# Patient Record
Sex: FEMALE | Race: WHITE | NOT HISPANIC OR LATINO | Employment: FULL TIME | URBAN - METROPOLITAN AREA
[De-identification: names, ages, dates, MRNs, and addresses within clinical notes are randomized per-mention and may not be internally consistent; named-entity substitution may affect disease eponyms.]

---

## 2017-08-20 ENCOUNTER — HOSPITAL ENCOUNTER (EMERGENCY)
Facility: HOSPITAL | Age: 27
Discharge: HOME | End: 2017-08-21
Attending: EMERGENCY MEDICINE | Admitting: EMERGENCY MEDICINE

## 2017-08-20 ENCOUNTER — APPOINTMENT (OUTPATIENT)
Dept: ULTRASOUND IMAGING | Facility: HOSPITAL | Age: 27
End: 2017-08-20

## 2017-08-20 DIAGNOSIS — O20.9 VAGINAL BLEEDING IN PREGNANCY, FIRST TRIMESTER: ICD-10-CM

## 2017-08-20 DIAGNOSIS — O20.0 THREATENED MISCARRIAGE IN EARLY PREGNANCY: Primary | ICD-10-CM

## 2017-08-20 LAB
ABO GROUP BLD: NORMAL
ALBUMIN SERPL-MCNC: 4.8 G/DL (ref 3.2–4.8)
ALBUMIN/GLOB SERPL: 1.4 G/DL (ref 1.5–2.5)
ALP SERPL-CCNC: 81 U/L (ref 25–100)
ALT SERPL W P-5'-P-CCNC: 22 U/L (ref 7–40)
ANION GAP SERPL CALCULATED.3IONS-SCNC: 5 MMOL/L (ref 3–11)
AST SERPL-CCNC: 19 U/L (ref 0–33)
BACTERIA UR QL AUTO: ABNORMAL /HPF
BASOPHILS # BLD AUTO: 0.02 10*3/MM3 (ref 0–0.2)
BASOPHILS NFR BLD AUTO: 0.1 % (ref 0–1)
BILIRUB SERPL-MCNC: 0.2 MG/DL (ref 0.3–1.2)
BILIRUB UR QL STRIP: NEGATIVE
BUN BLD-MCNC: 12 MG/DL (ref 9–23)
BUN/CREAT SERPL: 15 (ref 7–25)
CALCIUM SPEC-SCNC: 9.9 MG/DL (ref 8.7–10.4)
CHLORIDE SERPL-SCNC: 108 MMOL/L (ref 99–109)
CLARITY UR: CLEAR
CO2 SERPL-SCNC: 26 MMOL/L (ref 20–31)
COD CRY URNS QL: ABNORMAL /HPF
COLOR UR: YELLOW
CREAT BLD-MCNC: 0.8 MG/DL (ref 0.6–1.3)
DEPRECATED RDW RBC AUTO: 42.7 FL (ref 37–54)
EOSINOPHIL # BLD AUTO: 0.33 10*3/MM3 (ref 0–0.3)
EOSINOPHIL NFR BLD AUTO: 2.3 % (ref 0–3)
ERYTHROCYTE [DISTWIDTH] IN BLOOD BY AUTOMATED COUNT: 12.8 % (ref 11.3–14.5)
GFR SERPL CREATININE-BSD FRML MDRD: 87 ML/MIN/1.73
GLOBULIN UR ELPH-MCNC: 3.5 GM/DL
GLUCOSE BLD-MCNC: 72 MG/DL (ref 70–100)
GLUCOSE UR STRIP-MCNC: NEGATIVE MG/DL
HCG INTACT+B SERPL-ACNC: 89 MIU/ML
HCT VFR BLD AUTO: 46 % (ref 34.5–44)
HGB BLD-MCNC: 15.7 G/DL (ref 11.5–15.5)
HGB UR QL STRIP.AUTO: ABNORMAL
HYALINE CASTS UR QL AUTO: ABNORMAL /LPF
IMM GRANULOCYTES # BLD: 0.03 10*3/MM3 (ref 0–0.03)
IMM GRANULOCYTES NFR BLD: 0.2 % (ref 0–0.6)
KETONES UR QL STRIP: NEGATIVE
LEUKOCYTE ESTERASE UR QL STRIP.AUTO: ABNORMAL
LYMPHOCYTES # BLD AUTO: 3.67 10*3/MM3 (ref 0.6–4.8)
LYMPHOCYTES NFR BLD AUTO: 25.3 % (ref 24–44)
MCH RBC QN AUTO: 31.3 PG (ref 27–31)
MCHC RBC AUTO-ENTMCNC: 34.1 G/DL (ref 32–36)
MCV RBC AUTO: 91.6 FL (ref 80–99)
MONOCYTES # BLD AUTO: 1.18 10*3/MM3 (ref 0–1)
MONOCYTES NFR BLD AUTO: 8.1 % (ref 0–12)
MUCOUS THREADS URNS QL MICRO: ABNORMAL /HPF
NEUTROPHILS # BLD AUTO: 9.29 10*3/MM3 (ref 1.5–8.3)
NEUTROPHILS NFR BLD AUTO: 64 % (ref 41–71)
NITRITE UR QL STRIP: NEGATIVE
NUMBER OF DOSES: NORMAL
PH UR STRIP.AUTO: 5.5 [PH] (ref 5–8)
PLATELET # BLD AUTO: 348 10*3/MM3 (ref 150–450)
PMV BLD AUTO: 10.2 FL (ref 6–12)
POTASSIUM BLD-SCNC: 3.5 MMOL/L (ref 3.5–5.5)
PROT SERPL-MCNC: 8.3 G/DL (ref 5.7–8.2)
PROT UR QL STRIP: NEGATIVE
RBC # BLD AUTO: 5.02 10*6/MM3 (ref 3.89–5.14)
RBC # UR: ABNORMAL /HPF
REF LAB TEST METHOD: ABNORMAL
RH BLD: POSITIVE
SODIUM BLD-SCNC: 139 MMOL/L (ref 132–146)
SP GR UR STRIP: >=1.03 (ref 1–1.03)
SQUAMOUS #/AREA URNS HPF: ABNORMAL /HPF
UROBILINOGEN UR QL STRIP: ABNORMAL
WBC NRBC COR # BLD: 14.52 10*3/MM3 (ref 3.5–10.8)
WBC UR QL AUTO: ABNORMAL /HPF

## 2017-08-20 PROCEDURE — 36415 COLL VENOUS BLD VENIPUNCTURE: CPT

## 2017-08-20 PROCEDURE — 86900 BLOOD TYPING SEROLOGIC ABO: CPT | Performed by: EMERGENCY MEDICINE

## 2017-08-20 PROCEDURE — 76817 TRANSVAGINAL US OBSTETRIC: CPT

## 2017-08-20 PROCEDURE — 87086 URINE CULTURE/COLONY COUNT: CPT | Performed by: EMERGENCY MEDICINE

## 2017-08-20 PROCEDURE — 84702 CHORIONIC GONADOTROPIN TEST: CPT | Performed by: EMERGENCY MEDICINE

## 2017-08-20 PROCEDURE — 80053 COMPREHEN METABOLIC PANEL: CPT | Performed by: EMERGENCY MEDICINE

## 2017-08-20 PROCEDURE — 86901 BLOOD TYPING SEROLOGIC RH(D): CPT | Performed by: EMERGENCY MEDICINE

## 2017-08-20 PROCEDURE — 85025 COMPLETE CBC W/AUTO DIFF WBC: CPT | Performed by: EMERGENCY MEDICINE

## 2017-08-20 PROCEDURE — 81001 URINALYSIS AUTO W/SCOPE: CPT | Performed by: EMERGENCY MEDICINE

## 2017-08-20 PROCEDURE — 99284 EMERGENCY DEPT VISIT MOD MDM: CPT

## 2017-08-20 RX ORDER — ACETAMINOPHEN 500 MG
1000 TABLET ORAL ONCE
Status: COMPLETED | OUTPATIENT
Start: 2017-08-20 | End: 2017-08-20

## 2017-08-20 RX ADMIN — ACETAMINOPHEN 1000 MG: 500 TABLET ORAL at 21:15

## 2017-08-21 VITALS
HEART RATE: 75 BPM | RESPIRATION RATE: 18 BRPM | BODY MASS INDEX: 33.46 KG/M2 | DIASTOLIC BLOOD PRESSURE: 76 MMHG | TEMPERATURE: 98.6 F | OXYGEN SATURATION: 97 % | HEIGHT: 64 IN | SYSTOLIC BLOOD PRESSURE: 110 MMHG | WEIGHT: 196 LBS

## 2017-08-21 NOTE — ED PROVIDER NOTES
Subjective   HPI Comments: Ms. Margie Marrufo is an approximately 6 week pregnant 27 year old female who presents to the ED with c/o vaginal bleeding. The patient states that she began to notice brown colored spots while wiping 2 days ago. The patient states that it has persisted until today, when she noticed that the spots turned a light pink. The patient states that the bleeding has been accompanied by clots and tissue. The patient reports that the bleeding was originally accompanied by abd cramping, however the cramping ceased after passing clots today. The patient denies any other acute sx at this time. /A0    Patient is a 27 y.o. female presenting with vaginal bleeding.   History provided by:  Patient  Vaginal Bleeding   Quality:  Bright red, clots and passed tissue  Severity:  Mild  Onset quality:  Gradual  Duration:  2 days  Timing:  Constant  Progression:  Partially resolved  Chronicity:  New  Menstrual history:  Missed period  Possible pregnancy: yes    Relieved by:  Nothing  Worsened by:  Nothing  Ineffective treatments:  None tried  Abdominal pain: Cramping.    Risk factors: no prior miscarriage        Review of Systems   Constitutional: Negative.    Respiratory: Negative.    Cardiovascular: Negative.    Gastrointestinal: Abdominal pain: Cramping.   Genitourinary: Positive for vaginal bleeding.   Musculoskeletal: Negative.    Allergic/Immunologic: Negative for immunocompromised state.   Hematological: Does not bruise/bleed easily.   Psychiatric/Behavioral: Negative.    All other systems reviewed and are negative.      History reviewed. No pertinent past medical history.    No Known Allergies    Past Surgical History:   Procedure Laterality Date   • CHOLECYSTECTOMY     • EAR TUBES     • TONSILLECTOMY         History reviewed. No pertinent family history.    Social History     Social History   • Marital status: Single     Spouse name: N/A   • Number of children: N/A   • Years of education: N/A      Social History Main Topics   • Smoking status: Former Smoker   • Smokeless tobacco: None      Comment: quit smoking a couple days ago - usually smokes 1/2 PPD   • Alcohol use No   • Drug use: No   • Sexual activity: Not Asked     Other Topics Concern   • None     Social History Narrative   • None         Objective   Physical Exam   Constitutional: She is oriented to person, place, and time. She appears well-developed and well-nourished. No distress.   HENT:   Head: Normocephalic and atraumatic.   Eyes: Conjunctivae are normal. No scleral icterus.   Neck: Normal range of motion. Neck supple.   Cardiovascular: Normal rate, regular rhythm, normal heart sounds and intact distal pulses.  Exam reveals no gallop and no friction rub.    No murmur heard.  Pulmonary/Chest: Effort normal and breath sounds normal. No respiratory distress. She has no wheezes. She has no rales.   Abdominal: Soft. Bowel sounds are normal. She exhibits no mass. There is tenderness in the suprapubic area. There is guarding (With deep palpation). There is no rigidity and no rebound.   Musculoskeletal: Normal range of motion.   Neurological: She is alert and oriented to person, place, and time.   Skin: Skin is warm and dry. She is not diaphoretic.   Psychiatric: She has a normal mood and affect. Her behavior is normal.   Nursing note and vitals reviewed.      Procedures         ED Course  ED Course   Value Comment By Time   ABO Type: A (Reviewed) Tyree Emery MD 08/20 2154   RH type: Positive (Reviewed) Tyree Emery MD 08/20 2154   HCG Quantitative: 89.00 (Reviewed) Tyree Emery MD 08/20 2202    No evidence of ectopic pregnancy or intrauterine pregnancy.  The patient's beta hCG is incredibly low.  She is likely experiencing an incomplete miscarriage.  At this point we will discharge her home to follow-up with OB within a couple of days for recheck of her hCG.  She is otherwise resting comfortable in no complaints.  Findings  were plan discussed.  Patient understands and agrees with plan. Tyree Emery MD 6       Recent Results (from the past 24 hour(s))   Comprehensive Metabolic Panel    Collection Time: 17  9:17 PM   Result Value Ref Range    Glucose 72 70 - 100 mg/dL    BUN 12 9 - 23 mg/dL    Creatinine 0.80 0.60 - 1.30 mg/dL    Sodium 139 132 - 146 mmol/L    Potassium 3.5 3.5 - 5.5 mmol/L    Chloride 108 99 - 109 mmol/L    CO2 26.0 20.0 - 31.0 mmol/L    Calcium 9.9 8.7 - 10.4 mg/dL    Total Protein 8.3 (H) 5.7 - 8.2 g/dL    Albumin 4.80 3.20 - 4.80 g/dL    ALT (SGPT) 22 7 - 40 U/L    AST (SGOT) 19 0 - 33 U/L    Alkaline Phosphatase 81 25 - 100 U/L    Total Bilirubin 0.2 (L) 0.3 - 1.2 mg/dL    eGFR Non African Amer 87 >60 mL/min/1.73    Globulin 3.5 gm/dL    A/G Ratio 1.4 (L) 1.5 - 2.5 g/dL    BUN/Creatinine Ratio 15.0 7.0 - 25.0    Anion Gap 5.0 3.0 - 11.0 mmol/L   hCG, Quantitative, Pregnancy    Collection Time: 17  9:17 PM   Result Value Ref Range    HCG Quantitative 89.00 mIU/mL    RhIg Evaluation    Collection Time: 17  9:17 PM   Result Value Ref Range    ABO Type A     RH type Positive    Doses of Rh Immune Globulin    Collection Time: 17  9:17 PM   Result Value Ref Range    Number of Doses       RhIg is not indicated due to the patient's Rh status   CBC Auto Differential    Collection Time: 17  9:17 PM   Result Value Ref Range    WBC 14.52 (H) 3.50 - 10.80 10*3/mm3    RBC 5.02 3.89 - 5.14 10*6/mm3    Hemoglobin 15.7 (H) 11.5 - 15.5 g/dL    Hematocrit 46.0 (H) 34.5 - 44.0 %    MCV 91.6 80.0 - 99.0 fL    MCH 31.3 (H) 27.0 - 31.0 pg    MCHC 34.1 32.0 - 36.0 g/dL    RDW 12.8 11.3 - 14.5 %    RDW-SD 42.7 37.0 - 54.0 fl    MPV 10.2 6.0 - 12.0 fL    Platelets 348 150 - 450 10*3/mm3    Neutrophil % 64.0 41.0 - 71.0 %    Lymphocyte % 25.3 24.0 - 44.0 %    Monocyte % 8.1 0.0 - 12.0 %    Eosinophil % 2.3 0.0 - 3.0 %    Basophil % 0.1 0.0 - 1.0 %    Immature Grans % 0.2 0.0 - 0.6 %     Neutrophils, Absolute 9.29 (H) 1.50 - 8.30 10*3/mm3    Lymphocytes, Absolute 3.67 0.60 - 4.80 10*3/mm3    Monocytes, Absolute 1.18 (H) 0.00 - 1.00 10*3/mm3    Eosinophils, Absolute 0.33 (H) 0.00 - 0.30 10*3/mm3    Basophils, Absolute 0.02 0.00 - 0.20 10*3/mm3    Immature Grans, Absolute 0.03 0.00 - 0.03 10*3/mm3   Urinalysis With / Culture If Indicated    Collection Time: 17  9:18 PM   Result Value Ref Range    Color, UA Yellow Yellow, Straw    Appearance, UA Clear Clear    pH, UA 5.5 5.0 - 8.0    Specific Gravity, UA >=1.030 1.001 - 1.030    Glucose, UA Negative Negative    Ketones, UA Negative Negative    Bilirubin, UA Negative Negative    Blood, UA Large (3+) (A) Negative    Protein, UA Negative Negative    Leuk Esterase, UA Small (1+) (A) Negative    Nitrite, UA Negative Negative    Urobilinogen, UA 0.2 E.U./dL 0.2 - 1.0 E.U./dL   Urinalysis, Microscopic Only    Collection Time: 17  9:18 PM   Result Value Ref Range    RBC, UA 3-6 (A) None Seen, 0-2 /HPF    WBC, UA 6-12 (A) None Seen /HPF    Bacteria, UA None Seen None Seen, Trace /HPF    Squamous Epithelial Cells, UA 3-6 (A) None Seen, 0-2 /HPF    Hyaline Casts, UA 0-6 0 - 6 /LPF    Calcium Oxalate Crystals, UA Large/3+ None Seen /HPF    Mucus, UA Small/1+ (A) None Seen, Trace /HPF    Methodology Manual Light Microscopy      Note: In addition to lab results from this visit, the labs listed above may include labs taken at another facility or during a different encounter within the last 24 hours. Please correlate lab times with ED admission and discharge times for further clarification of the services performed during this visit.    US Ob Transvaginal   Final Result   Abnormal   1.  No intrauterine gestation identified.  The differential includes very early    intrauterine pregnancy, spontaneous , as well as non-visualized ectopic    pregnancy. Follow-up ultrasound and correlation with serial beta HCG levels is    recommended for further  evaluation.   2.  Small right ovarian cysts.      THIS DOCUMENT HAS BEEN ELECTRONICALLY SIGNED BY TIMOTHY AG MD        Vitals:    08/20/17 2201 08/20/17 2336 08/21/17 0000 08/21/17 0015   BP:  123/75 110/76    BP Location:       Patient Position:       Pulse:  85  75   Resp:  16  18   Temp:       TempSrc:       SpO2: 97% 98% 97%    Weight:       Height:         Medications   acetaminophen (TYLENOL) tablet 1,000 mg (1,000 mg Oral Given 8/20/17 2115)     ECG/EMG Results (last 24 hours)     ** No results found for the last 24 hours. **                      MDM  Number of Diagnoses or Management Options     Amount and/or Complexity of Data Reviewed  Clinical lab tests: reviewed  Tests in the radiology section of CPT®: reviewed  Independent visualization of images, tracings, or specimens: yes        Final diagnoses:   Threatened miscarriage in early pregnancy   Vaginal bleeding in pregnancy, first trimester       Documentation assistance provided by payton Ramos.  Information recorded by the scribe was done at my direction and has been verified and validated by me.     Nelson Ramos  08/20/17 2129       Tyree Emery MD  08/21/17 0149

## 2017-08-22 LAB — BACTERIA SPEC AEROBE CULT: NORMAL

## 2018-01-18 LAB
EXTERNAL ABO GROUPING: NORMAL
EXTERNAL ANTIBODY SCREEN: NEGATIVE
EXTERNAL CHLAMYDIA SCREEN: NEGATIVE
EXTERNAL GONORRHEA SCREEN: NEGATIVE
EXTERNAL HEPATITIS B SURFACE ANTIGEN: NEGATIVE
EXTERNAL HEPATITIS C AB: <0.1
EXTERNAL RH FACTOR: POSITIVE
EXTERNAL RUBELLA QUALITATIVE: NORMAL
EXTERNAL SYPHILIS RPR SCREEN: NORMAL
EXTERNAL URINE DRUG SCREEN: NEGATIVE
HIV1 P24 AG SERPL QL IA: NORMAL

## 2018-03-17 ENCOUNTER — HOSPITAL ENCOUNTER (EMERGENCY)
Facility: HOSPITAL | Age: 28
End: 2018-03-17

## 2018-03-18 ENCOUNTER — HOSPITAL ENCOUNTER (OUTPATIENT)
Facility: HOSPITAL | Age: 28
Setting detail: OBSERVATION
Discharge: HOME | End: 2018-03-18
Attending: OBSTETRICS & GYNECOLOGY | Admitting: OBSTETRICS & GYNECOLOGY

## 2018-03-18 VITALS
SYSTOLIC BLOOD PRESSURE: 130 MMHG | HEART RATE: 103 BPM | HEIGHT: 64 IN | DIASTOLIC BLOOD PRESSURE: 72 MMHG | BODY MASS INDEX: 36.88 KG/M2 | WEIGHT: 216 LBS | TEMPERATURE: 98.4 F

## 2018-03-18 PROBLEM — M54.50 LOW BACK PAIN DURING PREGNANCY IN SECOND TRIMESTER: Status: ACTIVE | Noted: 2018-03-18

## 2018-03-18 PROBLEM — O26.892 LOW BACK PAIN DURING PREGNANCY IN SECOND TRIMESTER: Status: ACTIVE | Noted: 2018-03-18

## 2018-03-18 LAB
ALBUMIN SERPL-MCNC: 3.9 G/DL (ref 3.2–4.8)
ALBUMIN/GLOB SERPL: 1.2 G/DL (ref 1.5–2.5)
ALP SERPL-CCNC: 56 U/L (ref 25–100)
ALT SERPL W P-5'-P-CCNC: 15 U/L (ref 7–40)
ANION GAP SERPL CALCULATED.3IONS-SCNC: 11 MMOL/L (ref 3–11)
AST SERPL-CCNC: 17 U/L (ref 0–33)
BILIRUB SERPL-MCNC: 0.3 MG/DL (ref 0.3–1.2)
BILIRUB UR QL STRIP: NEGATIVE
BUN BLD-MCNC: 5 MG/DL (ref 9–23)
BUN/CREAT SERPL: 10 (ref 7–25)
CALCIUM SPEC-SCNC: 9 MG/DL (ref 8.7–10.4)
CHLORIDE SERPL-SCNC: 108 MMOL/L (ref 99–109)
CLARITY UR: CLEAR
CO2 SERPL-SCNC: 18 MMOL/L (ref 20–31)
COLOR UR: YELLOW
CREAT BLD-MCNC: 0.5 MG/DL (ref 0.6–1.3)
DEPRECATED RDW RBC AUTO: 43.5 FL (ref 37–54)
ERYTHROCYTE [DISTWIDTH] IN BLOOD BY AUTOMATED COUNT: 13.4 % (ref 11.3–14.5)
GFR SERPL CREATININE-BSD FRML MDRD: 149 ML/MIN/1.73
GLOBULIN UR ELPH-MCNC: 3.3 GM/DL
GLUCOSE BLD-MCNC: 111 MG/DL (ref 70–100)
GLUCOSE UR STRIP-MCNC: NEGATIVE MG/DL
HCT VFR BLD AUTO: 36.3 % (ref 34.5–44)
HGB BLD-MCNC: 12.3 G/DL (ref 11.5–15.5)
HGB UR QL STRIP.AUTO: NEGATIVE
KETONES UR QL STRIP: ABNORMAL
LEUKOCYTE ESTERASE UR QL STRIP.AUTO: NEGATIVE
MCH RBC QN AUTO: 30.2 PG (ref 27–31)
MCHC RBC AUTO-ENTMCNC: 33.9 G/DL (ref 32–36)
MCV RBC AUTO: 89.2 FL (ref 80–99)
NITRITE UR QL STRIP: NEGATIVE
PH UR STRIP.AUTO: 5.5 [PH] (ref 5–8)
PLATELET # BLD AUTO: 267 10*3/MM3 (ref 150–450)
PMV BLD AUTO: 10.4 FL (ref 6–12)
POTASSIUM BLD-SCNC: 4 MMOL/L (ref 3.5–5.5)
PROT SERPL-MCNC: 7.2 G/DL (ref 5.7–8.2)
PROT UR QL STRIP: NEGATIVE
RBC # BLD AUTO: 4.07 10*6/MM3 (ref 3.89–5.14)
SODIUM BLD-SCNC: 137 MMOL/L (ref 132–146)
SP GR UR STRIP: 1.02 (ref 1–1.03)
UROBILINOGEN UR QL STRIP: ABNORMAL
WBC NRBC COR # BLD: 11.71 10*3/MM3 (ref 3.5–10.8)

## 2018-03-18 PROCEDURE — 85027 COMPLETE CBC AUTOMATED: CPT | Performed by: OBSTETRICS & GYNECOLOGY

## 2018-03-18 PROCEDURE — 80053 COMPREHEN METABOLIC PANEL: CPT | Performed by: OBSTETRICS & GYNECOLOGY

## 2018-03-18 PROCEDURE — G0378 HOSPITAL OBSERVATION PER HR: HCPCS

## 2018-03-18 PROCEDURE — 99218 PR INITIAL OBSERVATION CARE/DAY 30 MINUTES: CPT | Performed by: OBSTETRICS & GYNECOLOGY

## 2018-03-18 PROCEDURE — 81003 URINALYSIS AUTO W/O SCOPE: CPT | Performed by: OBSTETRICS & GYNECOLOGY

## 2018-03-18 RX ORDER — PRENATAL WITH FERROUS FUM AND FOLIC ACID 3080; 920; 120; 400; 22; 1.84; 3; 20; 10; 1; 12; 200; 27; 25; 2 [IU]/1; [IU]/1; MG/1; [IU]/1; MG/1; MG/1; MG/1; MG/1; MG/1; MG/1; UG/1; MG/1; MG/1; MG/1; MG/1
TABLET ORAL DAILY
COMMUNITY

## 2018-03-18 RX ORDER — ACETAMINOPHEN 500 MG
1000 TABLET ORAL ONCE
Status: COMPLETED | OUTPATIENT
Start: 2018-03-18 | End: 2018-03-18

## 2018-03-18 RX ADMIN — ACETAMINOPHEN 1000 MG: 500 TABLET ORAL at 00:58

## 2018-03-18 NOTE — H&P
"Margie Marrufo  1990  [id number]  [id number]    CC: back and abdominal pain  HPI:  Patient is 27 y.o. white female   currently at 20w5d  Presents with c/o lumbo/sacral and lower abd pain.  Onset ~2200, intermittent, radiates to lower abd, denies assoc vag bleeding or SROM, but has had N,V,D (X3).  Pt denies fever.  Pt describes pain as achy.  Seen last week for N,V and flu test was negative.  Also c/o decreased fetal movement.    PMH:   Current meds PNV  Illnesses PCOS, migraines  Surgeries lap edouard, T and A  Allergies amoxicillin-makes me feel like my heart is going to explode    Past OB History:       Obstetric History       T0      L0     SAB1   TAB0   Ectopic0   Molar0   Multiple0   Live Births0       # Outcome Date GA Lbr Gary/2nd Weight Sex Delivery Anes PTL Lv   2 Current            1 SAB 17 8w0d    SAB                  SH: tob neg , EtOH neg, drugs neg  FH: heart dz pos , diabetes pos , cancer neg    General ROS: All systems reviewed and negative except for HA, N, V, D      Physical Examination: General appearance - alert, well appearing, and in no distress  Vital signs - /72 (BP Location: Right arm, Patient Position: Sitting)   Pulse 103   Temp 98.4 °F (36.9 °C) (Oral)   Ht 162.6 cm (64\")   Wt 98 kg (216 lb)   LMP 10/24/2017   BMI 37.08 kg/m²   HEENT: normocephalic, atraumatic, pharynx clear   Neck - supple, no significant adenopathy  Lymphatics - no palpable lymphadenopathy, no hepatosplenomegaly  Chest - clear to auscultation, no wheezes, rales or rhonchi, symmetric air entry  Heart - normal rate, regular rhythm, normal S1, S2, no murmurs, rubs, clicks or gallops, no JVD  Abdomen - soft, nontender, nondistended, no masses or organomegaly  no rebound tenderness noted, bowel sounds normal  Vaginal Exam: cl/th/ballot  Extremities - no pedal edema noted, no calf tend  Skin - normal coloration and turgor, no rashes, no suspicious skin lesions noted        Fetal " monitoring:FHT's 140's      Radiology US:limited US shows +FHT's, normal fluid, ant placenta    Assessment 1)IUP 20 5/7 weeks    2)low back pain pregnancy    3)N,V,D    4)obesity    Plan 1)observe      2)labs     3)if labs normal, home    4)keep next sched appt    Jeremías Oneil MD  3/18/2018  12:53 AM

## 2018-03-25 ENCOUNTER — HOSPITAL ENCOUNTER (INPATIENT)
Facility: HOSPITAL | Age: 28
LOS: 1 days | Discharge: HOME | End: 2018-03-26
Attending: OBSTETRICS & GYNECOLOGY | Admitting: OBSTETRICS & GYNECOLOGY

## 2018-03-25 DIAGNOSIS — O26.892 LOW BACK PAIN DURING PREGNANCY IN SECOND TRIMESTER: Primary | ICD-10-CM

## 2018-03-25 DIAGNOSIS — O60.02 PRETERM LABOR IN SECOND TRIMESTER WITHOUT DELIVERY: ICD-10-CM

## 2018-03-25 DIAGNOSIS — M54.50 LOW BACK PAIN DURING PREGNANCY IN SECOND TRIMESTER: Primary | ICD-10-CM

## 2018-03-25 PROBLEM — O60.00 PRETERM LABOR: Status: ACTIVE | Noted: 2018-03-25

## 2018-03-25 LAB
ABO GROUP BLD: NORMAL
ALP SERPL-CCNC: 61 U/L (ref 25–100)
ALT SERPL W P-5'-P-CCNC: 12 U/L (ref 7–40)
AMPHET+METHAMPHET UR QL: NEGATIVE
AMPHETAMINES UR QL: NEGATIVE
AST SERPL-CCNC: 16 U/L (ref 0–33)
BACTERIA UR QL AUTO: ABNORMAL /HPF
BARBITURATES UR QL SCN: NEGATIVE
BENZODIAZ UR QL SCN: NEGATIVE
BILIRUB SERPL-MCNC: 0.3 MG/DL (ref 0.3–1.2)
BILIRUB UR QL STRIP: NEGATIVE
BLD GP AB SCN SERPL QL: NEGATIVE
BUPRENORPHINE SERPL-MCNC: NEGATIVE NG/ML
CANNABINOIDS SERPL QL: NEGATIVE
CLARITY UR: ABNORMAL
COCAINE UR QL: NEGATIVE
COLOR UR: YELLOW
CREAT BLD-MCNC: 0.5 MG/DL (ref 0.6–1.3)
DEPRECATED RDW RBC AUTO: 44.4 FL (ref 37–54)
ERYTHROCYTE [DISTWIDTH] IN BLOOD BY AUTOMATED COUNT: 13.5 % (ref 11.3–14.5)
GLUCOSE UR STRIP-MCNC: NEGATIVE MG/DL
HCT VFR BLD AUTO: 36.8 % (ref 34.5–44)
HGB BLD-MCNC: 12.2 G/DL (ref 11.5–15.5)
HGB UR QL STRIP.AUTO: ABNORMAL
HYALINE CASTS UR QL AUTO: ABNORMAL /LPF
KETONES UR QL STRIP: ABNORMAL
LDH SERPL-CCNC: 163 U/L (ref 120–246)
LEUKOCYTE ESTERASE UR QL STRIP.AUTO: ABNORMAL
MCH RBC QN AUTO: 30 PG (ref 27–31)
MCHC RBC AUTO-ENTMCNC: 33.2 G/DL (ref 32–36)
MCV RBC AUTO: 90.4 FL (ref 80–99)
METHADONE UR QL SCN: NEGATIVE
NITRITE UR QL STRIP: NEGATIVE
OPIATES UR QL: NEGATIVE
OXYCODONE UR QL SCN: NEGATIVE
PCP UR QL SCN: NEGATIVE
PH UR STRIP.AUTO: 7 [PH] (ref 5–8)
PLATELET # BLD AUTO: 290 10*3/MM3 (ref 150–450)
PMV BLD AUTO: 10.3 FL (ref 6–12)
PROPOXYPH UR QL: NEGATIVE
PROT UR QL STRIP: NEGATIVE
RBC # BLD AUTO: 4.07 10*6/MM3 (ref 3.89–5.14)
RBC # UR: ABNORMAL /HPF
REF LAB TEST METHOD: ABNORMAL
RH BLD: POSITIVE
SP GR UR STRIP: 1.03 (ref 1–1.03)
SQUAMOUS #/AREA URNS HPF: ABNORMAL /HPF
TRICYCLICS UR QL SCN: NEGATIVE
URATE SERPL-MCNC: 4.7 MG/DL (ref 3.1–7.8)
UROBILINOGEN UR QL STRIP: ABNORMAL
WBC NRBC COR # BLD: 9.42 10*3/MM3 (ref 3.5–10.8)
WBC UR QL AUTO: ABNORMAL /HPF

## 2018-03-25 PROCEDURE — 86901 BLOOD TYPING SEROLOGIC RH(D): CPT | Performed by: OBSTETRICS & GYNECOLOGY

## 2018-03-25 PROCEDURE — 25010000003 CEFAZOLIN 1 GM/50ML SOLUTION: Performed by: OBSTETRICS & GYNECOLOGY

## 2018-03-25 PROCEDURE — 81001 URINALYSIS AUTO W/SCOPE: CPT | Performed by: OBSTETRICS & GYNECOLOGY

## 2018-03-25 PROCEDURE — 86900 BLOOD TYPING SEROLOGIC ABO: CPT | Performed by: OBSTETRICS & GYNECOLOGY

## 2018-03-25 PROCEDURE — 25010000002 MAGNESIUM SULFATE-LACT RINGERS 40 GM/580ML SOLUTION: Performed by: OBSTETRICS & GYNECOLOGY

## 2018-03-25 PROCEDURE — 84550 ASSAY OF BLOOD/URIC ACID: CPT | Performed by: OBSTETRICS & GYNECOLOGY

## 2018-03-25 PROCEDURE — 84460 ALANINE AMINO (ALT) (SGPT): CPT | Performed by: OBSTETRICS & GYNECOLOGY

## 2018-03-25 PROCEDURE — 85027 COMPLETE CBC AUTOMATED: CPT | Performed by: OBSTETRICS & GYNECOLOGY

## 2018-03-25 PROCEDURE — 84075 ASSAY ALKALINE PHOSPHATASE: CPT | Performed by: OBSTETRICS & GYNECOLOGY

## 2018-03-25 PROCEDURE — 88305 TISSUE EXAM BY PATHOLOGIST: CPT | Performed by: OBSTETRICS & GYNECOLOGY

## 2018-03-25 PROCEDURE — 83615 LACTATE (LD) (LDH) ENZYME: CPT | Performed by: OBSTETRICS & GYNECOLOGY

## 2018-03-25 PROCEDURE — 99221 1ST HOSP IP/OBS SF/LOW 40: CPT | Performed by: OBSTETRICS & GYNECOLOGY

## 2018-03-25 PROCEDURE — 82565 ASSAY OF CREATININE: CPT | Performed by: OBSTETRICS & GYNECOLOGY

## 2018-03-25 PROCEDURE — 82247 BILIRUBIN TOTAL: CPT | Performed by: OBSTETRICS & GYNECOLOGY

## 2018-03-25 PROCEDURE — 86850 RBC ANTIBODY SCREEN: CPT | Performed by: OBSTETRICS & GYNECOLOGY

## 2018-03-25 PROCEDURE — 84450 TRANSFERASE (AST) (SGOT): CPT | Performed by: OBSTETRICS & GYNECOLOGY

## 2018-03-25 PROCEDURE — 80306 DRUG TEST PRSMV INSTRMNT: CPT | Performed by: OBSTETRICS & GYNECOLOGY

## 2018-03-25 RX ORDER — SODIUM CHLORIDE 0.9 % (FLUSH) 0.9 %
1-10 SYRINGE (ML) INJECTION AS NEEDED
Status: DISCONTINUED | OUTPATIENT
Start: 2018-03-25 | End: 2018-03-26

## 2018-03-25 RX ORDER — OXYTOCIN-SODIUM CHLORIDE 0.9% IV SOLN 30 UNIT/500ML 30-0.9/5 UT/ML-%
2-24 SOLUTION INTRAVENOUS
Status: DISCONTINUED | OUTPATIENT
Start: 2018-03-25 | End: 2018-03-26 | Stop reason: HOSPADM

## 2018-03-25 RX ORDER — MAGNESIUM SULF/RINGERS LACTATE 40 G/500ML
2 PLASTIC BAG, INJECTION (ML) INTRAVENOUS CONTINUOUS
Status: DISCONTINUED | OUTPATIENT
Start: 2018-03-25 | End: 2018-03-25

## 2018-03-25 RX ORDER — ZOLPIDEM TARTRATE 5 MG/1
5 TABLET ORAL NIGHTLY PRN
Status: DISCONTINUED | OUTPATIENT
Start: 2018-03-25 | End: 2018-03-26

## 2018-03-25 RX ORDER — METHYLERGONOVINE MALEATE 0.2 MG/1
200 TABLET ORAL EVERY 6 HOURS SCHEDULED
Status: CANCELLED | OUTPATIENT
Start: 2018-03-26

## 2018-03-25 RX ORDER — HYDROCODONE BITARTRATE AND ACETAMINOPHEN 5; 325 MG/1; MG/1
1 TABLET ORAL EVERY 4 HOURS PRN
Status: DISCONTINUED | OUTPATIENT
Start: 2018-03-25 | End: 2018-03-26

## 2018-03-25 RX ORDER — ACETAMINOPHEN 500 MG
1000 TABLET ORAL ONCE
Status: COMPLETED | OUTPATIENT
Start: 2018-03-25 | End: 2018-03-25

## 2018-03-25 RX ORDER — DEXTROSE, SODIUM CHLORIDE, SODIUM LACTATE, POTASSIUM CHLORIDE, AND CALCIUM CHLORIDE 5; .6; .31; .03; .02 G/100ML; G/100ML; G/100ML; G/100ML; G/100ML
100 INJECTION, SOLUTION INTRAVENOUS CONTINUOUS
Status: DISCONTINUED | OUTPATIENT
Start: 2018-03-25 | End: 2018-03-26 | Stop reason: HOSPADM

## 2018-03-25 RX ORDER — SODIUM CHLORIDE 0.9 % (FLUSH) 0.9 %
1-10 SYRINGE (ML) INJECTION AS NEEDED
Status: DISCONTINUED | OUTPATIENT
Start: 2018-03-25 | End: 2018-03-26 | Stop reason: HOSPADM

## 2018-03-25 RX ORDER — HYDROCODONE BITARTRATE AND ACETAMINOPHEN 7.5; 325 MG/1; MG/1
2 TABLET ORAL EVERY 4 HOURS PRN
Status: DISCONTINUED | OUTPATIENT
Start: 2018-03-25 | End: 2018-03-26

## 2018-03-25 RX ORDER — ONDANSETRON 2 MG/ML
4 INJECTION INTRAMUSCULAR; INTRAVENOUS EVERY 6 HOURS PRN
Status: DISCONTINUED | OUTPATIENT
Start: 2018-03-25 | End: 2018-03-26

## 2018-03-25 RX ADMIN — SODIUM CHLORIDE, POTASSIUM CHLORIDE, SODIUM LACTATE AND CALCIUM CHLORIDE 1000 ML: 600; 310; 30; 20 INJECTION, SOLUTION INTRAVENOUS at 16:35

## 2018-03-25 RX ADMIN — CEFAZOLIN SODIUM 1 G: 1 INJECTION, SOLUTION INTRAVENOUS at 18:07

## 2018-03-25 RX ADMIN — ACETAMINOPHEN 1000 MG: 500 TABLET ORAL at 21:32

## 2018-03-25 RX ADMIN — Medication 2 G/HR: at 17:03

## 2018-03-25 RX ADMIN — Medication 999 ML/HR: at 23:25

## 2018-03-25 NOTE — H&P
[facility]  Obstetric History and Physical    Chief Complaint   Patient presents with   • Laboring       Subjective     Patient is a 27 y.o. female  currently at 21w5d by a 8w6d U/S, who presents with c/o abdominal pain. Margie Presents with moderate severe abdominal pain onset last evening with vaginal spotting..  She denies  Rupture membranes, recent trauma, fever and precipitating factors.  Prenatal care initially in [facility] transferred care to , U/S 3/21  normal biometry and normal cervical length, EDC 18.    Her prenatal care is complicated by  .  Her previous obstetric/gynecological history is noted for is remarkable for .    The following portions of the patients history were reviewed and updated as appropriate: current medications, allergies, past medical history, past surgical history, past family history, past social history and problem list .       Prenatal Information:   Maternal Prenatal Labs  Blood Type No results found for: ABO   Rh Status No results found for: RH   Antibody Screen No results found for: ABSCRN   Gonnorhea No results found for: GCCX   Chlamydia No results found for: CLAMYDCU   RPR No results found for: RPR   Syphilis Antibody No results found for: SYPHILIS   Rubella No results found for: RUBELLAIGGIN   Hepatitis B Surface Antigen No results found for: HEPBSAG   HIV-1 Antibody No results found for: LABHIV1   Hepatitis C Antibody No results found for: HEPCAB   Rapid Urin Drug Screen No results found for: AMPMETHU, BARBITSCNUR, LABBENZSCN, LABMETHSCN, LABOPIASCN, THCURSCR, COCAINEUR, AMPHETSCREEN, PROPOXSCN, BUPRENORSCNU, METAMPSCNUR, OXYCODONESCN, TRICYCLICSCN   Group B Strep Culture No results found for: GBSANTIGEN           External Prenatal Results         Pregnancy Outside Results - these were transcribed from office records.  See scanned records for details. Date Time   Hgb  12.3 g/dL 18 0049   Hct  36.3 % 18 0049   ABO ^ A  18     Rh ^ Positive  18    Antibody Screen ^ Negative  18    Glucose Fasting GTT      Glucose Tolerance Test 1 hour      Glucose Tolerance Test 3 hour      Gonorrhea (discrete) ^ Negative  18    Chlamydia (discrete) ^ Negative  18    RPR      VDRL      Syphillis Antibody      Rubella      HBsAg ^ Negative  18    Herpes Simplex Virus PCR      Herpes Simplex VIrus Culture      HIV      Hep C RNA Quant PCR      Hep C Antibody      AFP      Group B Strep      GBS Susceptibility to Clindamycin      GBS Susceptibility to Eythromycin      Fetal Fibronectin      Genetic Testing, Maternal Blood      Drug Screening Date Time   Urine Drug Screen ^ Negative  18    Amphetamine Screen      Barbiturate Screen      Benzodiazepine Screen      Methadone Screen      Phencyclidine Screen      Opiates Screen      THC Screen      Cocaine Screen      Propoxyphene Screen      Buprenorphine Screen      Methamphetamine Screen      Oxycodone Screen      Tryicyclic Antidepressants Screen                Legend: ^: Historical                       Past OB History:       Obstetric History       T0      L0     SAB1   TAB0   Ectopic0   Molar0   Multiple0   Live Births0       # Outcome Date GA Lbr Gary/2nd Weight Sex Delivery Anes PTL Lv   2 Current            1 SAB 17 8w0d    SAB             Past Medical History: Past Medical History:   Diagnosis Date   • PCOS (polycystic ovarian syndrome)    • Urinary tract infection    • Urogenital trichomoniasis     treated many years ago      Past Surgical History Past Surgical History:   Procedure Laterality Date   • CHOLECYSTECTOMY     • EAR TUBES     • TONSILLECTOMY        Family History: No family history on file.   Social History:  reports that she quit smoking about a year ago. She does not have any smokeless tobacco history on file.   reports that she does not drink alcohol.   reports that she does not use drugs.                   General ROS Negative  Findings:Headaches, Visual Changes, Epigastric pain, Anorexia, Nausia/Vomiting and ROM    ROS      Objective       Vital Signs Range for the last 24 hours  Temperature:     Temp Source:     BP:     Pulse:     Respirations:     SPO2:     O2 Amount (l/min):     O2 Devices     Weight: Weight:  [98.4 kg (217 lb)] 98.4 kg (217 lb)     Physical Examination:   General:   alert, appears stated age and cooperative   Skin:   normal   HEENT:     Lungs:   clear to auscultation bilaterally   Heart:   regular rate and rhythm, S1, S2 normal, no murmur, click, rub or gallop   Abdomen:  soft, gravid uterus non tender guarding, no rebound, negative CVA tenderness.     Lower Extremeties Tr edema, Calf tenderness, DTRs 2+ over 4 no clonus    Pelvis: Speculum exam performed membranes   Prolapsed to the upper portion of the vagina  CX  3-4cm ?         Presentation: vtx   Cervix: Exam by:     Dilation:     Effacement:     Station:         Fetal Heart Rate Assessment   Method:     Beats/min:     Baseline:     Varibility:     Accels:     Decels:     Tracing Category:       Uterine Assessment   Method:     Frequency (min):     Ctx Count in 10 min:     Duration:     Intensity:     Intensity by IUPC:     Resting Tone:     Resting Tone by IUPC:     [place] Units:       Laboratory Results:   Lab Results (last 24 hours)     ** No results found for the last 24 hours. **        Radiology Review:   Imaging Results (last 24 hours)     ** No results found for the last 24 hours. **        Other Studies: U/S  VTX   FHR  150s    Assessment/Plan     Active Problems:     labor        Assessment:  1.  Intrauterine pregnancy at 21w5d weeks gestation with reassuring fetal status.    2.  Threatened  AB  3.  No signs of Overt infection  4.  Patient request that we try to stop  Labor. I D/W patient that if there are signs of infection, we will recommend induction     Plan:  1. Admit, Labs, IV, mag, ABX.  2. Plan of care has been reviewed with  patient.  3.  Risks, benefits of treatment plan have been discussed.  4.  All questions have been answered.  5   D/W Dr Massiel Beauchamp, DO  3/25/2018  4:40 PM

## 2018-03-26 VITALS
SYSTOLIC BLOOD PRESSURE: 119 MMHG | WEIGHT: 217 LBS | RESPIRATION RATE: 14 BRPM | HEART RATE: 90 BPM | OXYGEN SATURATION: 99 % | BODY MASS INDEX: 37.25 KG/M2 | TEMPERATURE: 98.6 F | DIASTOLIC BLOOD PRESSURE: 65 MMHG

## 2018-03-26 PROBLEM — O60.00 PRETERM LABOR: Status: RESOLVED | Noted: 2018-03-25 | Resolved: 2018-03-26

## 2018-03-26 PROCEDURE — 88239 TISSUE CULTURE TUMOR: CPT | Performed by: OBSTETRICS & GYNECOLOGY

## 2018-03-26 PROCEDURE — 87075 CULTR BACTERIA EXCEPT BLOOD: CPT | Performed by: OBSTETRICS & GYNECOLOGY

## 2018-03-26 PROCEDURE — 88291 CYTO/MOLECULAR REPORT: CPT | Performed by: OBSTETRICS & GYNECOLOGY

## 2018-03-26 PROCEDURE — 87798 DETECT AGENT NOS DNA AMP: CPT

## 2018-03-26 PROCEDURE — 88262 CHROMOSOME ANALYSIS 15-20: CPT | Performed by: OBSTETRICS & GYNECOLOGY

## 2018-03-26 PROCEDURE — 87205 SMEAR GRAM STAIN: CPT | Performed by: OBSTETRICS & GYNECOLOGY

## 2018-03-26 PROCEDURE — 87491 CHLMYD TRACH DNA AMP PROBE: CPT | Performed by: OBSTETRICS & GYNECOLOGY

## 2018-03-26 PROCEDURE — 87076 CULTURE ANAEROBE IDENT EACH: CPT | Performed by: OBSTETRICS & GYNECOLOGY

## 2018-03-26 PROCEDURE — 87070 CULTURE OTHR SPECIMN AEROBIC: CPT | Performed by: OBSTETRICS & GYNECOLOGY

## 2018-03-26 PROCEDURE — 87591 N.GONORRHOEAE DNA AMP PROB: CPT

## 2018-03-26 RX ORDER — ZOLPIDEM TARTRATE 5 MG/1
5 TABLET ORAL NIGHTLY PRN
Status: DISCONTINUED | OUTPATIENT
Start: 2018-03-26 | End: 2018-03-26 | Stop reason: HOSPADM

## 2018-03-26 RX ORDER — PROMETHAZINE HYDROCHLORIDE 12.5 MG/1
12.5 SUPPOSITORY RECTAL EVERY 6 HOURS PRN
Status: DISCONTINUED | OUTPATIENT
Start: 2018-03-26 | End: 2018-03-26 | Stop reason: HOSPADM

## 2018-03-26 RX ORDER — ACETAMINOPHEN 325 MG/1
650 TABLET ORAL EVERY 4 HOURS PRN
Status: DISCONTINUED | OUTPATIENT
Start: 2018-03-26 | End: 2018-03-26 | Stop reason: HOSPADM

## 2018-03-26 RX ORDER — OXYTOCIN/RINGER'S LACTATE 20/1000 ML
999 PLASTIC BAG, INJECTION (ML) INTRAVENOUS ONCE
Status: COMPLETED | OUTPATIENT
Start: 2018-03-26 | End: 2018-03-25

## 2018-03-26 RX ORDER — OXYTOCIN/RINGER'S LACTATE 20/1000 ML
125 PLASTIC BAG, INJECTION (ML) INTRAVENOUS CONTINUOUS PRN
Status: DISCONTINUED | OUTPATIENT
Start: 2018-03-26 | End: 2018-03-26 | Stop reason: HOSPADM

## 2018-03-26 RX ORDER — SODIUM CHLORIDE 0.9 % (FLUSH) 0.9 %
1-10 SYRINGE (ML) INJECTION AS NEEDED
Status: DISCONTINUED | OUTPATIENT
Start: 2018-03-26 | End: 2018-03-26 | Stop reason: HOSPADM

## 2018-03-26 RX ORDER — PROMETHAZINE HYDROCHLORIDE 25 MG/1
25 TABLET ORAL EVERY 6 HOURS PRN
Status: DISCONTINUED | OUTPATIENT
Start: 2018-03-26 | End: 2018-03-26 | Stop reason: HOSPADM

## 2018-03-26 RX ORDER — METHYLERGONOVINE MALEATE 0.2 MG/ML
200 INJECTION INTRAVENOUS ONCE AS NEEDED
Status: DISCONTINUED | OUTPATIENT
Start: 2018-03-26 | End: 2018-03-26 | Stop reason: HOSPADM

## 2018-03-26 RX ORDER — PROMETHAZINE HYDROCHLORIDE 25 MG/ML
12.5 INJECTION, SOLUTION INTRAMUSCULAR; INTRAVENOUS EVERY 6 HOURS PRN
Status: DISCONTINUED | OUTPATIENT
Start: 2018-03-26 | End: 2018-03-26 | Stop reason: HOSPADM

## 2018-03-26 RX ORDER — IBUPROFEN 600 MG/1
600 TABLET ORAL EVERY 6 HOURS PRN
Qty: 25 TABLET | Refills: 2 | OUTPATIENT
Start: 2018-03-26 | End: 2019-02-11

## 2018-03-26 RX ORDER — IBUPROFEN 600 MG/1
600 TABLET ORAL EVERY 6 HOURS PRN
Status: DISCONTINUED | OUTPATIENT
Start: 2018-03-26 | End: 2018-03-26 | Stop reason: HOSPADM

## 2018-03-26 RX ORDER — HYDROCODONE BITARTRATE AND ACETAMINOPHEN 5; 325 MG/1; MG/1
1 TABLET ORAL EVERY 4 HOURS PRN
Qty: 24 TABLET | Refills: 0 | Status: SHIPPED | OUTPATIENT
Start: 2018-03-26 | End: 2018-04-05

## 2018-03-26 RX ORDER — HYDROCODONE BITARTRATE AND ACETAMINOPHEN 5; 325 MG/1; MG/1
1 TABLET ORAL EVERY 4 HOURS PRN
Status: DISCONTINUED | OUTPATIENT
Start: 2018-03-26 | End: 2018-03-26 | Stop reason: HOSPADM

## 2018-03-26 RX ORDER — SODIUM CHLORIDE, SODIUM LACTATE, POTASSIUM CHLORIDE, CALCIUM CHLORIDE 600; 310; 30; 20 MG/100ML; MG/100ML; MG/100ML; MG/100ML
125 INJECTION, SOLUTION INTRAVENOUS CONTINUOUS
Status: DISCONTINUED | OUTPATIENT
Start: 2018-03-26 | End: 2018-03-26 | Stop reason: HOSPADM

## 2018-03-26 RX ORDER — DOCUSATE SODIUM 100 MG/1
100 CAPSULE, LIQUID FILLED ORAL 2 TIMES DAILY PRN
Status: DISCONTINUED | OUTPATIENT
Start: 2018-03-26 | End: 2018-03-26 | Stop reason: HOSPADM

## 2018-03-26 RX ADMIN — Medication 125 ML/HR: at 00:03

## 2018-03-26 NOTE — L&D DELIVERY NOTE
[facility]  Vaginal Delivery Note    Delivery     Delivery:       Date of Birth:     Time of Birth:      Anesthesia:       Delivering clinician: Julio Rankin    Forceps?   No   Vacuum? No    Shoulder dystocia present: No        Delivery narrative:  Pt delivered without much warning in bed from vertex presentation; moving but no respirations or Heart beat heard. Clearly previable fetus.     Infant    Findings: unspecified sex  infant     Infant observations: Weight: No birth weight on file.   Length:    in  Observations/Comments:         Apgars:    @ 1 minute /       @ 5 minutes   Infant Name: pending     Placenta, Cord, and Fluid    Placenta delivered     at         Cord:    present.   Nuchal Cord?  no   Cord blood obtained:      Cord gases obtained:       Cord gas results: Venous:  No results found for: PHCVEN    Arterial:  No results found for: PHCART     Repair    Episiotomy: Not recorded    Lacerations: No   Estimated Blood Loss:             Complications  None; Placenta delivered spontaneously intact.     Disposition  Mother to Mother Baby/Postpartum  in stable condition currently.  Baby to remains with mom   is non viable.      Julio Rankin MD  03/26/18  12:00 AM

## 2018-03-26 NOTE — NURSING NOTE
Mary Jean, called to make a visit to see patient to discuss burial options. KETAN Doe called and message left to let her know about patient admit and needed info about burial information.

## 2018-03-26 NOTE — DISCHARGE SUMMARY
[facility]  Delivery Discharge Summary    Primary OB Clinician:     EDC: Estimated Date of Delivery: 18    Gestational Age:21w5d    Antepartum complications: Labor at 21 weeks with del emminent    Date of Delivery: 3/25/2018   Time of Delivery: 11:09 PM     Delivered By:  Julio Rankin     Delivery Type: Vaginal, Spontaneous Delivery      Tubal Ligation: n/a    Baby:@BABYNOHDR(SEX)@ infant;   Apgar:  2   @ 1 minute /   Apgar:     @ 5 minutes   Weight: @BABYNOHDR(BIRTHWEIGHT)@   Length: @BABYNOHDR(DELRECITEM,HSB,22983,,,,)@    Anesthesia: None      Intrapartum complications:  vag del with n9on viable 21 week fetus    Laceration: No    Episiotomy: No    Placenta: Spontaneous     Feeding method:      Rh Immune globulin given:     Rubella vaccine given:     Discharge Date: 3/26/2018; Discharge Time: 8:29 AM    Early Discharge:  NO    Plan:    Address and phone number verified and same.  Follow-up appointment with rebeka in 4 week.

## 2018-03-26 NOTE — L&D DELIVERY NOTE
[facility]  Vaginal Delivery Note    Delivery     Delivery: Vaginal, Spontaneous Delivery     YOB: 2017    Time of Birth: 11:09 PM      Anesthesia: None     Delivering clinician: Julio Rankin    Forceps?   No   Vacuum? No    Shoulder dystocia present: No        Delivery narrative:  Pt quickly delivered non viable premature infant. Vertex presentation. Baby was moving extremeties No respirations or heart beat heard. Placenta delivered spontaneously intact. No lacerations; good hemostasis.     Infant    Findings: female  infant     Infant observations: Weight: 400 g (14.1 oz)   Length: 11  in  Observations/Comments:         Apgars: 2   @ 1 minute /       @ 5 minutes   Infant Name:      Placenta, Cord, and Fluid    Placenta delivered  Spontaneous  at   3/25 11:23 PM     Cord: Unknown  present.   Nuchal Cord?  no   Cord blood obtained: No    Cord gases obtained:  No    Cord gas results: Venous:  No results found for: PHCVEN    Arterial:  No results found for: PHCART     Repair    Episiotomy: None    Lacerations: No   Estimated Blood Loss:             Complications  None.    Disposition  Mother to Mother Baby/Postpartum  in stable condition currently.  Baby to remains with mom  in fetal demise.      Julio Rankin MD  04/04/18  6:31 PM

## 2018-03-26 NOTE — NURSING NOTE
Pt OOB to BR for void. Pericare done, pads and panties on, and gown changed. Bed linens changed and pt returned to bed.

## 2018-03-26 NOTE — PROGRESS NOTES
Pt notes stronger contractions and vaginal pressure/ discomfort.     Exam; membranes filling vagina. cx 4-5 cm;     Options reviewed; delivery eminent; Pt requests to proceed with AROM and allow delivery.     D/C mag sulfate.

## 2018-03-26 NOTE — PROGRESS NOTES
Margie called out saying she is feeling the contractions more strongly and closer together.    SVE:  Complete with bulging membranes in the vagina.    She is still intact    USN:  Shows cephalic with good movement.    Margie realizes that delivery is imminent and fetal survival is ultimately unlikely.  She request having NICU evaluate and be on hand.  I have decreased the Magnesium to 1 gm/hr.    She declines IV pain medication or epidural    NICU  And Dr. Rankin notified

## 2018-03-28 LAB
CYTO UR: NORMAL
LAB AP CASE REPORT: NORMAL
LAB AP CLINICAL INFORMATION: NORMAL
Lab: NORMAL
PATH REPORT.FINAL DX SPEC: NORMAL
PATH REPORT.GROSS SPEC: NORMAL

## 2018-03-30 LAB
BACTERIA SPEC AEROBE CULT: ABNORMAL
BACTERIA SPEC AEROBE CULT: ABNORMAL
GRAM STN SPEC: ABNORMAL
REF LAB TEST RESULTS: NORMAL

## 2018-04-02 LAB
BACTERIA SPEC ANAEROBE CULT: ABNORMAL

## 2018-04-06 ENCOUNTER — TELEPHONE (OUTPATIENT)
Dept: LABOR AND DELIVERY | Facility: HOSPITAL | Age: 28
End: 2018-04-06

## 2018-04-11 LAB — DEPRECATED BIOPSY CULTURE SMEAR TISS: NORMAL

## 2019-01-04 ENCOUNTER — LAB REQUISITION (OUTPATIENT)
Dept: LAB | Facility: HOSPITAL | Age: 29
End: 2019-01-04

## 2019-01-04 DIAGNOSIS — Z00.00 ROUTINE GENERAL MEDICAL EXAMINATION AT A HEALTH CARE FACILITY: ICD-10-CM

## 2019-01-04 LAB — HCG INTACT+B SERPL-ACNC: 1155 MIU/ML

## 2019-01-04 PROCEDURE — 84702 CHORIONIC GONADOTROPIN TEST: CPT | Performed by: OBSTETRICS & GYNECOLOGY

## 2019-02-11 ENCOUNTER — HOSPITAL ENCOUNTER (EMERGENCY)
Facility: HOSPITAL | Age: 29
Discharge: HOME | End: 2019-02-11
Attending: EMERGENCY MEDICINE | Admitting: EMERGENCY MEDICINE

## 2019-02-11 VITALS
WEIGHT: 215 LBS | HEIGHT: 64 IN | OXYGEN SATURATION: 99 % | SYSTOLIC BLOOD PRESSURE: 122 MMHG | DIASTOLIC BLOOD PRESSURE: 80 MMHG | TEMPERATURE: 98.7 F | RESPIRATION RATE: 16 BRPM | HEART RATE: 94 BPM | BODY MASS INDEX: 36.7 KG/M2

## 2019-02-11 DIAGNOSIS — R10.32 LLQ PAIN: ICD-10-CM

## 2019-02-11 DIAGNOSIS — N39.0 URINARY TRACT INFECTION WITHOUT HEMATURIA, SITE UNSPECIFIED: Primary | ICD-10-CM

## 2019-02-11 DIAGNOSIS — Z3A.11 11 WEEKS GESTATION OF PREGNANCY: ICD-10-CM

## 2019-02-11 DIAGNOSIS — Z3A.11 PREGNANCY WITH 11 COMPLETED WEEKS GESTATION: ICD-10-CM

## 2019-02-11 LAB
BACTERIA UR QL AUTO: ABNORMAL /HPF
BASOPHILS # BLD AUTO: 0.01 10*3/MM3 (ref 0–0.2)
BASOPHILS NFR BLD AUTO: 0.1 % (ref 0–1)
BILIRUB UR QL STRIP: NEGATIVE
CLARITY UR: ABNORMAL
COLOR UR: YELLOW
DEPRECATED RDW RBC AUTO: 40.7 FL (ref 37–54)
EOSINOPHIL # BLD AUTO: 0.07 10*3/MM3 (ref 0–0.3)
EOSINOPHIL NFR BLD AUTO: 0.9 % (ref 0–3)
ERYTHROCYTE [DISTWIDTH] IN BLOOD BY AUTOMATED COUNT: 13.1 % (ref 11.3–14.5)
GLUCOSE UR STRIP-MCNC: NEGATIVE MG/DL
HCG INTACT+B SERPL-ACNC: NORMAL MIU/ML
HCT VFR BLD AUTO: 38.6 % (ref 34.5–44)
HGB BLD-MCNC: 13.1 G/DL (ref 11.5–15.5)
HGB UR QL STRIP.AUTO: NEGATIVE
HOLD SPECIMEN: NORMAL
HYALINE CASTS UR QL AUTO: ABNORMAL /LPF
IMM GRANULOCYTES # BLD AUTO: 0.01 10*3/MM3 (ref 0–0.05)
IMM GRANULOCYTES NFR BLD AUTO: 0.1 % (ref 0–0.6)
KETONES UR QL STRIP: NEGATIVE
LEUKOCYTE ESTERASE UR QL STRIP.AUTO: ABNORMAL
LIPASE SERPL-CCNC: 33 U/L (ref 6–51)
LYMPHOCYTES # BLD AUTO: 2.14 10*3/MM3 (ref 0.6–4.8)
LYMPHOCYTES NFR BLD AUTO: 26.1 % (ref 24–44)
MCH RBC QN AUTO: 29.1 PG (ref 27–31)
MCHC RBC AUTO-ENTMCNC: 33.9 G/DL (ref 32–36)
MCV RBC AUTO: 85.8 FL (ref 80–99)
MONOCYTES # BLD AUTO: 0.56 10*3/MM3 (ref 0–1)
MONOCYTES NFR BLD AUTO: 6.8 % (ref 0–12)
NEUTROPHILS # BLD AUTO: 5.42 10*3/MM3 (ref 1.5–8.3)
NEUTROPHILS NFR BLD AUTO: 66.1 % (ref 41–71)
NITRITE UR QL STRIP: NEGATIVE
PH UR STRIP.AUTO: 7 [PH] (ref 5–8)
PLATELET # BLD AUTO: 291 10*3/MM3 (ref 150–450)
PMV BLD AUTO: 9.9 FL (ref 6–12)
PROT UR QL STRIP: NEGATIVE
RBC # BLD AUTO: 4.5 10*6/MM3 (ref 3.89–5.14)
RBC # UR: ABNORMAL /HPF
REF LAB TEST METHOD: ABNORMAL
SP GR UR STRIP: 1.02 (ref 1–1.03)
SQUAMOUS #/AREA URNS HPF: ABNORMAL /HPF
UROBILINOGEN UR QL STRIP: ABNORMAL
WBC NRBC COR # BLD: 8.2 10*3/MM3 (ref 3.5–10.8)
WBC UR QL AUTO: ABNORMAL /HPF
WHOLE BLOOD HOLD SPECIMEN: NORMAL
WHOLE BLOOD HOLD SPECIMEN: NORMAL

## 2019-02-11 PROCEDURE — 81001 URINALYSIS AUTO W/SCOPE: CPT

## 2019-02-11 PROCEDURE — 84702 CHORIONIC GONADOTROPIN TEST: CPT

## 2019-02-11 PROCEDURE — 85025 COMPLETE CBC W/AUTO DIFF WBC: CPT

## 2019-02-11 PROCEDURE — 83690 ASSAY OF LIPASE: CPT

## 2019-02-11 PROCEDURE — 99283 EMERGENCY DEPT VISIT LOW MDM: CPT

## 2019-02-11 RX ORDER — ONDANSETRON 4 MG/1
4 TABLET, ORALLY DISINTEGRATING ORAL EVERY 8 HOURS PRN
Qty: 20 TABLET | Refills: 0 | Status: SHIPPED | OUTPATIENT
Start: 2019-02-11 | End: 2019-04-04

## 2019-02-11 RX ORDER — SODIUM CHLORIDE 0.9 % (FLUSH) 0.9 %
10 SYRINGE (ML) INJECTION AS NEEDED
Status: DISCONTINUED | OUTPATIENT
Start: 2019-02-11 | End: 2019-02-11 | Stop reason: HOSPADM

## 2019-02-11 RX ORDER — NITROFURANTOIN 25; 75 MG/1; MG/1
100 CAPSULE ORAL 2 TIMES DAILY
Qty: 20 CAPSULE | Refills: 0 | Status: SHIPPED | OUTPATIENT
Start: 2019-02-11 | End: 2019-03-21

## 2019-02-11 NOTE — DISCHARGE INSTRUCTIONS
Rest.  Macrobid as prescribed.  Zofran for nausea.  Follow up with Dr. Prater at next available appointment.  Return to ER if worse pain, vomiting, fever, bleeding or other concerns.

## 2019-02-11 NOTE — ED PROVIDER NOTES
Subjective   28-year-old female presents to the emergency department with complaints of left lower quadrant cramping.  The patient states that she is 11 weeks pregnant ( Ab1).  Her obstetrician is Dr. Prater.  Crampy pain began this morning.  It is somewhat worse with movement.  She has been having some nausea and vomiting associated with her pregnancy but it is no worse than usual today.  No fever.  She notes that she has had some mild dysuria over the past few days.  No fever or chills.  She does have a history of polycystic ovarian syndrome.  No other known health issues.  She has a follow-up appointment with Dr. mejia on .  She states that she has seen Dr. mejia during this pregnancy and had a normal ultrasound a few weeks ago.  She is not having any bleeding or discharge.            Review of Systems   Constitutional: Negative for chills and fever.   Respiratory: Negative for cough and shortness of breath.    Cardiovascular: Negative for chest pain.   Gastrointestinal: Positive for abdominal pain (LLQ pain), nausea and vomiting. Negative for constipation and diarrhea.   Endocrine: Negative for polydipsia, polyphagia and polyuria.   Genitourinary: Positive for dysuria and frequency. Negative for vaginal bleeding and vaginal discharge.   Musculoskeletal: Negative for back pain.   Skin: Negative for rash.   Allergic/Immunologic: Negative for immunocompromised state.   Neurological: Negative for headaches.   Hematological: Negative.    Psychiatric/Behavioral: Negative.        Past Medical History:   Diagnosis Date   • PCOS (polycystic ovarian syndrome)    • Urinary tract infection    • Urogenital trichomoniasis     treated many years ago       Allergies   Allergen Reactions   • Penicillins Palpitations       Past Surgical History:   Procedure Laterality Date   • CHOLECYSTECTOMY     • EAR TUBES     • TONSILLECTOMY         History reviewed. No pertinent family history.    Social History      Socioeconomic History   • Marital status: Single     Spouse name: Not on file   • Number of children: Not on file   • Years of education: Not on file   • Highest education level: Not on file   Tobacco Use   • Smoking status: Former Smoker     Last attempt to quit: 3/25/2017     Years since quittin.8   • Smokeless tobacco: Never Used   • Tobacco comment: quit smoking a couple days ago - usually smokes 1/2 PPD   Substance and Sexual Activity   • Alcohol use: No   • Drug use: No   • Sexual activity: Defer           Objective   Physical Exam   Constitutional: She is oriented to person, place, and time. She appears well-developed and well-nourished. No distress.   HENT:   Head: Normocephalic.   Nose: Nose normal.   Mouth/Throat: Oropharynx is clear and moist.   Eyes: Conjunctivae are normal. Pupils are equal, round, and reactive to light.   Neck: Normal range of motion. Neck supple.   Cardiovascular: Normal rate, regular rhythm, normal heart sounds and intact distal pulses.   Pulmonary/Chest: Effort normal and breath sounds normal.   Abdominal: Soft. Bowel sounds are normal. There is no tenderness. There is no guarding.   Musculoskeletal: Normal range of motion. She exhibits no edema or tenderness.   Neurological: She is alert and oriented to person, place, and time.   Skin: Skin is warm and dry.   Psychiatric: She has a normal mood and affect.       Procedures           ED Course      The patient appears in no distress.  Concerning labs.  Her urine does show trace bacteria and a few white cells.  Her hCG is 64,323.  Normal white count at 8.2.  The patient states that she needs to go  her children.  I discussed her lab results and urine results.  She states that at the present time, her pain has resolved.  I will prescribe Macrobid and Zofran and have her follow-up with her obstetrician or return to the emergency department if her symptoms persist or worsen.  Recent Results (from the past 24 hour(s))    Lipase    Collection Time: 02/11/19  3:36 PM   Result Value Ref Range    Lipase 33 6 - 51 U/L   hCG, Quantitative, Pregnancy    Collection Time: 02/11/19  3:36 PM   Result Value Ref Range    HCG Quantitative 64,323.00 mIU/mL   Light Blue Top    Collection Time: 02/11/19  3:36 PM   Result Value Ref Range    Extra Tube hold for add-on    Green Top (Gel)    Collection Time: 02/11/19  3:36 PM   Result Value Ref Range    Extra Tube Hold for add-ons.    Lavender Top    Collection Time: 02/11/19  3:36 PM   Result Value Ref Range    Extra Tube hold for add-on    Gold Top - SST    Collection Time: 02/11/19  3:36 PM   Result Value Ref Range    Extra Tube Hold for add-ons.    Green Top (No Gel)    Collection Time: 02/11/19  3:36 PM   Result Value Ref Range    Extra Tube Hold for add-ons.    CBC Auto Differential    Collection Time: 02/11/19  3:36 PM   Result Value Ref Range    WBC 8.20 3.50 - 10.80 10*3/mm3    RBC 4.50 3.89 - 5.14 10*6/mm3    Hemoglobin 13.1 11.5 - 15.5 g/dL    Hematocrit 38.6 34.5 - 44.0 %    MCV 85.8 80.0 - 99.0 fL    MCH 29.1 27.0 - 31.0 pg    MCHC 33.9 32.0 - 36.0 g/dL    RDW 13.1 11.3 - 14.5 %    RDW-SD 40.7 37.0 - 54.0 fl    MPV 9.9 6.0 - 12.0 fL    Platelets 291 150 - 450 10*3/mm3    Neutrophil % 66.1 41.0 - 71.0 %    Lymphocyte % 26.1 24.0 - 44.0 %    Monocyte % 6.8 0.0 - 12.0 %    Eosinophil % 0.9 0.0 - 3.0 %    Basophil % 0.1 0.0 - 1.0 %    Immature Grans % 0.1 0.0 - 0.6 %    Neutrophils, Absolute 5.42 1.50 - 8.30 10*3/mm3    Lymphocytes, Absolute 2.14 0.60 - 4.80 10*3/mm3    Monocytes, Absolute 0.56 0.00 - 1.00 10*3/mm3    Eosinophils, Absolute 0.07 0.00 - 0.30 10*3/mm3    Basophils, Absolute 0.01 0.00 - 0.20 10*3/mm3    Immature Grans, Absolute 0.01 0.00 - 0.05 10*3/mm3   Urinalysis With Microscopic If Indicated (No Culture) - Urine, Clean Catch    Collection Time: 02/11/19  4:40 PM   Result Value Ref Range    Color, UA Yellow Yellow, Straw    Appearance, UA Cloudy (A) Clear    pH, UA 7.0 5.0 -  "8.0    Specific Gravity, UA 1.024 1.001 - 1.030    Glucose, UA Negative Negative    Ketones, UA Negative Negative    Bilirubin, UA Negative Negative    Blood, UA Negative Negative    Protein, UA Negative Negative    Leuk Esterase, UA Moderate (2+) (A) Negative    Nitrite, UA Negative Negative    Urobilinogen, UA 1.0 E.U./dL 0.2 - 1.0 E.U./dL   Urinalysis, Microscopic Only - Urine, Clean Catch    Collection Time: 02/11/19  4:40 PM   Result Value Ref Range    RBC, UA 0-2 None Seen, 0-2 /HPF    WBC, UA 6-12 (A) None Seen, 0-2 /HPF    Bacteria, UA Trace None Seen, Trace /HPF    Squamous Epithelial Cells, UA 3-6 (A) None Seen, 0-2 /HPF    Hyaline Casts, UA None Seen 0 - 6 /LPF    Methodology Automated Microscopy      Note: In addition to lab results from this visit, the labs listed above may include labs taken at another facility or during a different encounter within the last 24 hours. Please correlate lab times with ED admission and discharge times for further clarification of the services performed during this visit.    No orders to display     Vitals:    02/11/19 1527   BP: 129/76   BP Location: Left arm   Patient Position: Sitting   Pulse: 102   Resp: 16   Temp: 98.5 °F (36.9 °C)   TempSrc: Oral   SpO2: 97%   Weight: 97.5 kg (215 lb)   Height: 162.6 cm (64\")     Medications   sodium chloride 0.9 % flush 10 mL (not administered)     ECG/EMG Results (last 24 hours)     ** No results found for the last 24 hours. **        No orders to display                   MDM      Final diagnoses:   Urinary tract infection without hematuria, site unspecified   LLQ pain   Pregnancy with 11 completed weeks gestation   11 weeks gestation of pregnancy            Mario Urias, PA  02/11/19 1744    "

## 2019-02-21 LAB
EXTERNAL HEPATITIS B SURFACE ANTIGEN: NEGATIVE
EXTERNAL HEPATITIS C AB: NEGATIVE
EXTERNAL RUBELLA QUALITATIVE: NORMAL
EXTERNAL SYPHILIS RPR SCREEN: NORMAL
EXTERNAL URINE DRUG SCREEN: NEGATIVE
HIV1 P24 AG SERPL QL IA: NORMAL

## 2019-02-22 ENCOUNTER — TRANSCRIBE ORDERS (OUTPATIENT)
Dept: OBSTETRICS AND GYNECOLOGY | Facility: HOSPITAL | Age: 29
End: 2019-02-22

## 2019-02-22 DIAGNOSIS — O09.899 HISTORY OF PRETERM DELIVERY, CURRENTLY PREGNANT, UNSPECIFIED TRIMESTER: Primary | ICD-10-CM

## 2019-03-07 ENCOUNTER — HOSPITAL ENCOUNTER (OUTPATIENT)
Dept: WOMENS IMAGING | Facility: HOSPITAL | Age: 29
Discharge: HOME | End: 2019-03-07
Admitting: OBSTETRICS & GYNECOLOGY

## 2019-03-07 ENCOUNTER — OFFICE VISIT (OUTPATIENT)
Dept: OBSTETRICS AND GYNECOLOGY | Facility: HOSPITAL | Age: 29
End: 2019-03-07

## 2019-03-07 VITALS — BODY MASS INDEX: 37.69 KG/M2 | SYSTOLIC BLOOD PRESSURE: 130 MMHG | WEIGHT: 219.6 LBS | DIASTOLIC BLOOD PRESSURE: 69 MMHG

## 2019-03-07 DIAGNOSIS — O09.212 PREVIOUS PRETERM DELIVERY IN SECOND TRIMESTER, ANTEPARTUM: Primary | ICD-10-CM

## 2019-03-07 DIAGNOSIS — O09.899 HISTORY OF PRETERM DELIVERY, CURRENTLY PREGNANT, UNSPECIFIED TRIMESTER: ICD-10-CM

## 2019-03-07 PROCEDURE — 76817 TRANSVAGINAL US OBSTETRIC: CPT

## 2019-03-07 PROCEDURE — 99241 PR OFFICE CONSULTATION NEW/ESTAB PATIENT 15 MIN: CPT | Performed by: OBSTETRICS & GYNECOLOGY

## 2019-03-07 PROCEDURE — 76813 OB US NUCHAL MEAS 1 GEST: CPT | Performed by: OBSTETRICS & GYNECOLOGY

## 2019-03-07 PROCEDURE — 76817 TRANSVAGINAL US OBSTETRIC: CPT | Performed by: OBSTETRICS & GYNECOLOGY

## 2019-03-07 PROCEDURE — 76813 OB US NUCHAL MEAS 1 GEST: CPT

## 2019-03-07 NOTE — PROGRESS NOTES
Reports had [place] NIPS with normal results. To see Dr. Prater today. States is here today for cerclage consult. Reports intermittent pelvic pressure and low back pain past 2 weeks.

## 2019-03-07 NOTE — PROGRESS NOTES
Documentation of the ultasound findings, images, and interpretations as well as consultation note will be available in the patient's Viewpoint report located in the Chart Review Imaging tab in [employer].

## 2019-03-21 ENCOUNTER — OFFICE VISIT (OUTPATIENT)
Dept: OBSTETRICS AND GYNECOLOGY | Facility: HOSPITAL | Age: 29
End: 2019-03-21

## 2019-03-21 ENCOUNTER — HOSPITAL ENCOUNTER (OUTPATIENT)
Dept: WOMENS IMAGING | Facility: HOSPITAL | Age: 29
Discharge: HOME | End: 2019-03-21
Admitting: OBSTETRICS & GYNECOLOGY

## 2019-03-21 VITALS — WEIGHT: 220 LBS | SYSTOLIC BLOOD PRESSURE: 138 MMHG | DIASTOLIC BLOOD PRESSURE: 84 MMHG | BODY MASS INDEX: 37.76 KG/M2

## 2019-03-21 DIAGNOSIS — E66.01 MORBID OBESITY (HCC): ICD-10-CM

## 2019-03-21 DIAGNOSIS — O09.212 PREVIOUS PRETERM DELIVERY IN SECOND TRIMESTER, ANTEPARTUM: ICD-10-CM

## 2019-03-21 DIAGNOSIS — Z34.90 PREGNANCY, UNSPECIFIED GESTATIONAL AGE: ICD-10-CM

## 2019-03-21 DIAGNOSIS — O09.212 PREVIOUS PRETERM DELIVERY IN SECOND TRIMESTER, ANTEPARTUM: Primary | ICD-10-CM

## 2019-03-21 PROCEDURE — 76815 OB US LIMITED FETUS(S): CPT | Performed by: OBSTETRICS & GYNECOLOGY

## 2019-03-21 PROCEDURE — 76815 OB US LIMITED FETUS(S): CPT

## 2019-03-30 ENCOUNTER — HOSPITAL ENCOUNTER (OUTPATIENT)
Facility: HOSPITAL | Age: 29
Setting detail: OBSERVATION
Discharge: HOME | End: 2019-03-30
Attending: OBSTETRICS & GYNECOLOGY | Admitting: OBSTETRICS & GYNECOLOGY

## 2019-03-30 VITALS
WEIGHT: 221 LBS | HEIGHT: 64 IN | SYSTOLIC BLOOD PRESSURE: 128 MMHG | HEART RATE: 90 BPM | BODY MASS INDEX: 37.73 KG/M2 | DIASTOLIC BLOOD PRESSURE: 73 MMHG | TEMPERATURE: 98.4 F | RESPIRATION RATE: 16 BRPM

## 2019-03-30 LAB
ALBUMIN SERPL-MCNC: 4.08 G/DL (ref 3.2–4.8)
ALBUMIN/GLOB SERPL: 1.6 G/DL (ref 1.5–2.5)
ALP SERPL-CCNC: 55 U/L (ref 25–100)
ALT SERPL W P-5'-P-CCNC: 11 U/L (ref 7–40)
ANION GAP SERPL CALCULATED.3IONS-SCNC: 7 MMOL/L (ref 3–11)
AST SERPL-CCNC: 15 U/L (ref 0–33)
BACTERIA UR QL AUTO: NORMAL /HPF
BILIRUB SERPL-MCNC: 0.2 MG/DL (ref 0.3–1.2)
BILIRUB UR QL STRIP: NEGATIVE
BUN BLD-MCNC: 5 MG/DL (ref 9–23)
BUN/CREAT SERPL: 9.4 (ref 7–25)
CALCIUM SPEC-SCNC: 9.2 MG/DL (ref 8.7–10.4)
CHLORIDE SERPL-SCNC: 107 MMOL/L (ref 99–109)
CLARITY UR: CLEAR
CO2 SERPL-SCNC: 24 MMOL/L (ref 20–31)
COLOR UR: YELLOW
CREAT BLD-MCNC: 0.53 MG/DL (ref 0.6–1.3)
DEPRECATED RDW RBC AUTO: 43.8 FL (ref 37–54)
ERYTHROCYTE [DISTWIDTH] IN BLOOD BY AUTOMATED COUNT: 13.8 % (ref 11.3–14.5)
EXPIRATION DATE: ABNORMAL
GFR SERPL CREATININE-BSD FRML MDRD: 138 ML/MIN/1.73
GLOBULIN UR ELPH-MCNC: 2.6 GM/DL
GLUCOSE BLD-MCNC: 96 MG/DL (ref 70–100)
GLUCOSE UR STRIP-MCNC: NEGATIVE MG/DL
HCT VFR BLD AUTO: 37.6 % (ref 34.5–44)
HGB BLD-MCNC: 12.5 G/DL (ref 11.5–15.5)
HGB UR QL STRIP.AUTO: NEGATIVE
HYALINE CASTS UR QL AUTO: NORMAL /LPF
KETONES UR QL STRIP: NEGATIVE
LEUKOCYTE ESTERASE UR QL STRIP.AUTO: ABNORMAL
Lab: ABNORMAL
MCH RBC QN AUTO: 29.2 PG (ref 27–31)
MCHC RBC AUTO-ENTMCNC: 33.2 G/DL (ref 32–36)
MCV RBC AUTO: 87.9 FL (ref 80–99)
NITRITE UR QL STRIP: NEGATIVE
PH UR STRIP.AUTO: 7.5 [PH] (ref 5–8)
PLATELET # BLD AUTO: 270 10*3/MM3 (ref 150–450)
PMV BLD AUTO: 10 FL (ref 6–12)
POTASSIUM BLD-SCNC: 3.9 MMOL/L (ref 3.5–5.5)
PROT SERPL-MCNC: 6.7 G/DL (ref 5.7–8.2)
PROT UR QL STRIP: NEGATIVE
PROT UR STRIP-MCNC: ABNORMAL MG/DL
RBC # BLD AUTO: 4.28 10*6/MM3 (ref 3.89–5.14)
RBC # UR: NORMAL /HPF
REF LAB TEST METHOD: NORMAL
SODIUM BLD-SCNC: 138 MMOL/L (ref 132–146)
SP GR UR STRIP: 1.01 (ref 1–1.03)
SQUAMOUS #/AREA URNS HPF: NORMAL /HPF
UROBILINOGEN UR QL STRIP: ABNORMAL
WBC NRBC COR # BLD: 6.19 10*3/MM3 (ref 3.5–10.8)
WBC UR QL AUTO: NORMAL /HPF

## 2019-03-30 PROCEDURE — G0378 HOSPITAL OBSERVATION PER HR: HCPCS

## 2019-03-30 PROCEDURE — 80053 COMPREHEN METABOLIC PANEL: CPT | Performed by: OBSTETRICS & GYNECOLOGY

## 2019-03-30 PROCEDURE — 81002 URINALYSIS NONAUTO W/O SCOPE: CPT | Performed by: OBSTETRICS & GYNECOLOGY

## 2019-03-30 PROCEDURE — 81001 URINALYSIS AUTO W/SCOPE: CPT | Performed by: OBSTETRICS & GYNECOLOGY

## 2019-03-30 PROCEDURE — 99218 PR INITIAL OBSERVATION CARE/DAY 30 MINUTES: CPT | Performed by: OBSTETRICS & GYNECOLOGY

## 2019-03-30 PROCEDURE — 85027 COMPLETE CBC AUTOMATED: CPT | Performed by: OBSTETRICS & GYNECOLOGY

## 2019-04-04 ENCOUNTER — HOSPITAL ENCOUNTER (OUTPATIENT)
Dept: WOMENS IMAGING | Facility: HOSPITAL | Age: 29
Discharge: HOME | End: 2019-04-04
Admitting: OBSTETRICS & GYNECOLOGY

## 2019-04-04 ENCOUNTER — OFFICE VISIT (OUTPATIENT)
Dept: OBSTETRICS AND GYNECOLOGY | Facility: HOSPITAL | Age: 29
End: 2019-04-04

## 2019-04-04 VITALS — DIASTOLIC BLOOD PRESSURE: 86 MMHG | SYSTOLIC BLOOD PRESSURE: 129 MMHG | WEIGHT: 219 LBS | BODY MASS INDEX: 37.59 KG/M2

## 2019-04-04 DIAGNOSIS — E66.01 MORBID OBESITY (HCC): ICD-10-CM

## 2019-04-04 DIAGNOSIS — O09.212 PREVIOUS PRETERM DELIVERY IN SECOND TRIMESTER, ANTEPARTUM: ICD-10-CM

## 2019-04-04 DIAGNOSIS — Z34.90 PREGNANCY, UNSPECIFIED GESTATIONAL AGE: ICD-10-CM

## 2019-04-04 DIAGNOSIS — O09.212 PREVIOUS PRETERM DELIVERY IN SECOND TRIMESTER, ANTEPARTUM: Primary | ICD-10-CM

## 2019-04-04 PROCEDURE — 76811 OB US DETAILED SNGL FETUS: CPT | Performed by: OBSTETRICS & GYNECOLOGY

## 2019-04-04 PROCEDURE — 76811 OB US DETAILED SNGL FETUS: CPT

## 2019-04-04 NOTE — PROGRESS NOTES
"Pt reports occasional \"flutters\" for FM. Denies lof, vag bleeding or cramping. Pt states she will be starting Amberly injections at OB office soon.   "

## 2019-04-18 ENCOUNTER — HOSPITAL ENCOUNTER (OUTPATIENT)
Dept: WOMENS IMAGING | Facility: HOSPITAL | Age: 29
Discharge: HOME | End: 2019-04-18
Admitting: OBSTETRICS & GYNECOLOGY

## 2019-04-18 ENCOUNTER — OFFICE VISIT (OUTPATIENT)
Dept: OBSTETRICS AND GYNECOLOGY | Facility: HOSPITAL | Age: 29
End: 2019-04-18

## 2019-04-18 VITALS — WEIGHT: 221.2 LBS | BODY MASS INDEX: 37.97 KG/M2 | SYSTOLIC BLOOD PRESSURE: 131 MMHG | DIASTOLIC BLOOD PRESSURE: 64 MMHG

## 2019-04-18 DIAGNOSIS — E66.01 MORBID OBESITY (HCC): ICD-10-CM

## 2019-04-18 DIAGNOSIS — Z3A.20 20 WEEKS GESTATION OF PREGNANCY: ICD-10-CM

## 2019-04-18 DIAGNOSIS — O09.212 PREVIOUS PRETERM DELIVERY IN SECOND TRIMESTER, ANTEPARTUM: ICD-10-CM

## 2019-04-18 DIAGNOSIS — O09.212 PREVIOUS PRETERM DELIVERY IN SECOND TRIMESTER, ANTEPARTUM: Primary | ICD-10-CM

## 2019-04-18 PROCEDURE — 76815 OB US LIMITED FETUS(S): CPT

## 2019-04-18 PROCEDURE — 76815 OB US LIMITED FETUS(S): CPT | Performed by: OBSTETRICS & GYNECOLOGY

## 2019-04-18 NOTE — PROGRESS NOTES
Denies problems. To see Dr. Prater today. Reports started Lamar injections 2 weeks ago but missed her appointment for injection last week due to work schedule. She plans to discuss possibility of giving injections to self with Dr. Prater today.

## 2019-05-02 ENCOUNTER — OFFICE VISIT (OUTPATIENT)
Dept: OBSTETRICS AND GYNECOLOGY | Facility: HOSPITAL | Age: 29
End: 2019-05-02

## 2019-05-02 ENCOUNTER — HOSPITAL ENCOUNTER (OUTPATIENT)
Dept: WOMENS IMAGING | Facility: HOSPITAL | Age: 29
Discharge: HOME | End: 2019-05-02
Admitting: OBSTETRICS & GYNECOLOGY

## 2019-05-02 ENCOUNTER — HOSPITAL ENCOUNTER (INPATIENT)
Facility: HOSPITAL | Age: 29
LOS: 3 days | Discharge: HOME | End: 2019-05-05
Attending: OBSTETRICS & GYNECOLOGY | Admitting: OBSTETRICS & GYNECOLOGY

## 2019-05-02 VITALS — SYSTOLIC BLOOD PRESSURE: 126 MMHG | BODY MASS INDEX: 38.11 KG/M2 | WEIGHT: 222 LBS | DIASTOLIC BLOOD PRESSURE: 76 MMHG

## 2019-05-02 DIAGNOSIS — E66.01 MORBID OBESITY (HCC): ICD-10-CM

## 2019-05-02 DIAGNOSIS — N88.3 INCOMPETENT CERVIX: ICD-10-CM

## 2019-05-02 DIAGNOSIS — Z3A.20 20 WEEKS GESTATION OF PREGNANCY: ICD-10-CM

## 2019-05-02 DIAGNOSIS — O09.212 PREVIOUS PRETERM DELIVERY IN SECOND TRIMESTER, ANTEPARTUM: ICD-10-CM

## 2019-05-02 DIAGNOSIS — Z3A.22 22 WEEKS GESTATION OF PREGNANCY: ICD-10-CM

## 2019-05-02 DIAGNOSIS — O09.212 PREVIOUS PRETERM DELIVERY IN SECOND TRIMESTER, ANTEPARTUM: Primary | ICD-10-CM

## 2019-05-02 LAB
ABO GROUP BLD: NORMAL
ALP SERPL-CCNC: 60 U/L (ref 39–117)
ALT SERPL W P-5'-P-CCNC: 8 U/L (ref 1–33)
AST SERPL-CCNC: 12 U/L (ref 1–32)
BILIRUB SERPL-MCNC: <0.2 MG/DL (ref 0.2–1.2)
BILIRUB UR QL STRIP: NEGATIVE
BLD GP AB SCN SERPL QL: NEGATIVE
CLARITY UR: CLEAR
COLOR UR: YELLOW
CREAT BLD-MCNC: 0.46 MG/DL (ref 0.57–1)
DEPRECATED RDW RBC AUTO: 44.1 FL (ref 37–54)
ERYTHROCYTE [DISTWIDTH] IN BLOOD BY AUTOMATED COUNT: 13.6 % (ref 12.3–15.4)
GLUCOSE UR STRIP-MCNC: NEGATIVE MG/DL
HCT VFR BLD AUTO: 37.1 % (ref 34–46.6)
HGB BLD-MCNC: 12.4 G/DL (ref 12–15.9)
HGB UR QL STRIP.AUTO: NEGATIVE
KETONES UR QL STRIP: NEGATIVE
LDH SERPL-CCNC: 138 U/L (ref 135–214)
LEUKOCYTE ESTERASE UR QL STRIP.AUTO: NEGATIVE
MCH RBC QN AUTO: 29.7 PG (ref 26.6–33)
MCHC RBC AUTO-ENTMCNC: 33.4 G/DL (ref 31.5–35.7)
MCV RBC AUTO: 88.8 FL (ref 79–97)
NITRITE UR QL STRIP: NEGATIVE
PH UR STRIP.AUTO: 8 [PH] (ref 5–8)
PLATELET # BLD AUTO: 259 10*3/MM3 (ref 140–450)
PMV BLD AUTO: 10.2 FL (ref 6–12)
PROT UR QL STRIP: NEGATIVE
RBC # BLD AUTO: 4.18 10*6/MM3 (ref 3.77–5.28)
RH BLD: POSITIVE
SP GR UR STRIP: 1.01 (ref 1–1.03)
T&S EXPIRATION DATE: NORMAL
URATE SERPL-MCNC: 4 MG/DL (ref 2.4–5.7)
UROBILINOGEN UR QL STRIP: NORMAL
WBC NRBC COR # BLD: 7.02 10*3/MM3 (ref 3.4–10.8)

## 2019-05-02 PROCEDURE — 81003 URINALYSIS AUTO W/O SCOPE: CPT | Performed by: OBSTETRICS & GYNECOLOGY

## 2019-05-02 PROCEDURE — 51703 INSERT BLADDER CATH COMPLEX: CPT

## 2019-05-02 PROCEDURE — 84460 ALANINE AMINO (ALT) (SGPT): CPT | Performed by: OBSTETRICS & GYNECOLOGY

## 2019-05-02 PROCEDURE — 84450 TRANSFERASE (AST) (SGOT): CPT | Performed by: OBSTETRICS & GYNECOLOGY

## 2019-05-02 PROCEDURE — 87086 URINE CULTURE/COLONY COUNT: CPT | Performed by: OBSTETRICS & GYNECOLOGY

## 2019-05-02 PROCEDURE — 84550 ASSAY OF BLOOD/URIC ACID: CPT | Performed by: OBSTETRICS & GYNECOLOGY

## 2019-05-02 PROCEDURE — 59025 FETAL NON-STRESS TEST: CPT

## 2019-05-02 PROCEDURE — 76816 OB US FOLLOW-UP PER FETUS: CPT | Performed by: OBSTETRICS & GYNECOLOGY

## 2019-05-02 PROCEDURE — 83615 LACTATE (LD) (LDH) ENZYME: CPT | Performed by: OBSTETRICS & GYNECOLOGY

## 2019-05-02 PROCEDURE — 99222 1ST HOSP IP/OBS MODERATE 55: CPT | Performed by: OBSTETRICS & GYNECOLOGY

## 2019-05-02 PROCEDURE — 86901 BLOOD TYPING SEROLOGIC RH(D): CPT | Performed by: OBSTETRICS & GYNECOLOGY

## 2019-05-02 PROCEDURE — 25010000003 CEFAZOLIN 1-4 GM/50ML-% SOLUTION: Performed by: OBSTETRICS & GYNECOLOGY

## 2019-05-02 PROCEDURE — 82565 ASSAY OF CREATININE: CPT | Performed by: OBSTETRICS & GYNECOLOGY

## 2019-05-02 PROCEDURE — 76816 OB US FOLLOW-UP PER FETUS: CPT

## 2019-05-02 PROCEDURE — 86850 RBC ANTIBODY SCREEN: CPT | Performed by: OBSTETRICS & GYNECOLOGY

## 2019-05-02 PROCEDURE — 25010000002 MAGNESIUM SULFATE-LACT RINGERS 40 GM/580ML SOLUTION: Performed by: OBSTETRICS & GYNECOLOGY

## 2019-05-02 PROCEDURE — 86900 BLOOD TYPING SEROLOGIC ABO: CPT | Performed by: OBSTETRICS & GYNECOLOGY

## 2019-05-02 PROCEDURE — 85027 COMPLETE CBC AUTOMATED: CPT | Performed by: OBSTETRICS & GYNECOLOGY

## 2019-05-02 PROCEDURE — 84075 ASSAY ALKALINE PHOSPHATASE: CPT | Performed by: OBSTETRICS & GYNECOLOGY

## 2019-05-02 PROCEDURE — 0T9B70Z DRAINAGE OF BLADDER WITH DRAINAGE DEVICE, VIA NATURAL OR ARTIFICIAL OPENING: ICD-10-PCS | Performed by: OBSTETRICS & GYNECOLOGY

## 2019-05-02 PROCEDURE — 82247 BILIRUBIN TOTAL: CPT | Performed by: OBSTETRICS & GYNECOLOGY

## 2019-05-02 RX ORDER — CEPHALEXIN 250 MG/1
1000 CAPSULE ORAL EVERY 8 HOURS SCHEDULED
Status: DISCONTINUED | OUTPATIENT
Start: 2019-05-02 | End: 2019-05-02

## 2019-05-02 RX ORDER — AZITHROMYCIN 250 MG/1
1000 TABLET, FILM COATED ORAL ONCE
Status: DISCONTINUED | OUTPATIENT
Start: 2019-05-02 | End: 2019-05-02

## 2019-05-02 RX ORDER — LIDOCAINE HYDROCHLORIDE 10 MG/ML
5 INJECTION, SOLUTION EPIDURAL; INFILTRATION; INTRACAUDAL; PERINEURAL AS NEEDED
Status: DISCONTINUED | OUTPATIENT
Start: 2019-05-02 | End: 2019-05-03 | Stop reason: HOSPADM

## 2019-05-02 RX ORDER — ONDANSETRON 2 MG/ML
4 INJECTION INTRAMUSCULAR; INTRAVENOUS EVERY 6 HOURS PRN
Status: DISCONTINUED | OUTPATIENT
Start: 2019-05-02 | End: 2019-05-05 | Stop reason: HOSPADM

## 2019-05-02 RX ORDER — DOCUSATE SODIUM 100 MG/1
100 CAPSULE, LIQUID FILLED ORAL DAILY PRN
Status: DISCONTINUED | OUTPATIENT
Start: 2019-05-02 | End: 2019-05-05 | Stop reason: HOSPADM

## 2019-05-02 RX ORDER — CEPHALEXIN 250 MG/1
500 CAPSULE ORAL EVERY 6 HOURS
Status: DISCONTINUED | OUTPATIENT
Start: 2019-05-04 | End: 2019-05-03 | Stop reason: HOSPADM

## 2019-05-02 RX ORDER — SODIUM CHLORIDE 0.9 % (FLUSH) 0.9 %
1-10 SYRINGE (ML) INJECTION AS NEEDED
Status: DISCONTINUED | OUTPATIENT
Start: 2019-05-02 | End: 2019-05-03 | Stop reason: HOSPADM

## 2019-05-02 RX ORDER — AMOXICILLIN 250 MG/1
500 CAPSULE ORAL EVERY 8 HOURS
Status: DISCONTINUED | OUTPATIENT
Start: 2019-05-04 | End: 2019-05-02

## 2019-05-02 RX ORDER — AZITHROMYCIN 250 MG/1
1000 TABLET, FILM COATED ORAL ONCE
Status: COMPLETED | OUTPATIENT
Start: 2019-05-02 | End: 2019-05-02

## 2019-05-02 RX ORDER — MAGNESIUM SULF/RINGERS LACTATE 40 G/500ML
2 PLASTIC BAG, INJECTION (ML) INTRAVENOUS CONTINUOUS
Status: DISPENSED | OUTPATIENT
Start: 2019-05-02 | End: 2019-05-05

## 2019-05-02 RX ORDER — SODIUM CHLORIDE 0.9 % (FLUSH) 0.9 %
3 SYRINGE (ML) INJECTION EVERY 12 HOURS SCHEDULED
Status: DISCONTINUED | OUTPATIENT
Start: 2019-05-02 | End: 2019-05-03 | Stop reason: HOSPADM

## 2019-05-02 RX ORDER — DEXTROSE AND SODIUM CHLORIDE 5; .2 G/100ML; G/100ML
100 INJECTION, SOLUTION INTRAVENOUS CONTINUOUS
Status: DISCONTINUED | OUTPATIENT
Start: 2019-05-02 | End: 2019-05-05 | Stop reason: HOSPADM

## 2019-05-02 RX ORDER — PRENATAL VIT/IRON FUM/FOLIC AC 27MG-0.8MG
1 TABLET ORAL DAILY
Status: DISCONTINUED | OUTPATIENT
Start: 2019-05-02 | End: 2019-05-03

## 2019-05-02 RX ORDER — ACETAMINOPHEN 500 MG
1000 TABLET ORAL 4 TIMES DAILY PRN
Status: DISCONTINUED | OUTPATIENT
Start: 2019-05-02 | End: 2019-05-05 | Stop reason: HOSPADM

## 2019-05-02 RX ORDER — CEFAZOLIN SODIUM 1 G/50ML
1 INJECTION, SOLUTION INTRAVENOUS EVERY 8 HOURS
Status: COMPLETED | OUTPATIENT
Start: 2019-05-02 | End: 2019-05-04

## 2019-05-02 RX ORDER — MAGNESIUM SULF/RINGERS LACTATE 40 G/500ML
PLASTIC BAG, INJECTION (ML) INTRAVENOUS
Status: COMPLETED
Start: 2019-05-02 | End: 2019-05-04

## 2019-05-02 RX ADMIN — CEFAZOLIN SODIUM 1 G: 1 INJECTION, SOLUTION INTRAVENOUS at 22:32

## 2019-05-02 RX ADMIN — DEXTROSE AND SODIUM CHLORIDE 100 ML/HR: 5; 200 INJECTION, SOLUTION INTRAVENOUS at 10:40

## 2019-05-02 RX ADMIN — AZITHROMYCIN 1000 MG: 250 TABLET, FILM COATED ORAL at 12:36

## 2019-05-02 RX ADMIN — ACETAMINOPHEN 1000 MG: 500 TABLET, FILM COATED ORAL at 16:12

## 2019-05-02 RX ADMIN — CEFAZOLIN SODIUM 1 G: 1 INJECTION, SOLUTION INTRAVENOUS at 12:35

## 2019-05-02 RX ADMIN — Medication 2 G/HR: at 11:23

## 2019-05-02 RX ADMIN — Medication 3 G/HR: at 12:50

## 2019-05-02 RX ADMIN — PRENATAL VIT W/ FE FUMARATE-FA TAB 27-0.8 MG 1 TABLET: 27-0.8 TAB at 12:33

## 2019-05-02 NOTE — H&P
\[facility]  Obstetric History and Physical    Referring Provider: Kavitha Prater MD      Chief Complaint   Patient presents with   • hourglass membranes       Subjective     Patient is a 28 y.o. female  currently at 22w1d, who presents from  diagnostic center with incompetent cervix.  Patient was seen today at the  diagnostic center for fetal surveillance and a history of midtrimester loss.  PD C ultrasound noted size consistent with dates, no fetal anomalies identified, no fetal markers for trisomy, and the cervix appeared funneled to external os.  Patient denies abdominal pain, leaking of fluid, vaginal bleeding, recent trauma any associated symptoms or concerns.  Patient reports normal fetal activity.  No care by Dr. Prater complicated by prior pregnancy ended in a midtrimester loss.  Patient was on [place], discontinued secondary to intolerance.    Her prenatal care is complicated by  cervical incompetence.  Her previous obstetric/gynecological history is noted for is remarkable for .    The following portions of the patients history were reviewed and updated as appropriate: current medications, allergies, past medical history, past surgical history, past family history, past social history and problem list .       Prenatal Information:   Maternal Prenatal Labs  Blood Type No results found for: ABO   Rh Status No results found for: RH   Antibody Screen No results found for: ABSCRN   Gonnorhea No results found for: GCCX   Chlamydia No results found for: CLAMYDCU   RPR No results found for: RPR   Syphilis Antibody No results found for: SYPHILIS   Rubella No results found for: RUBELLAIGGIN   Hepatitis B Surface Antigen No results found for: HEPBSAG   HIV-1 Antibody No results found for: LABHIV1   Hepatitis C Antibody No results found for: HEPCAB   Rapid Urin Drug Screen No results found for: AMPMETHU, BARBITSCNUR, LABBENZSCN, LABMETHSCN, LABOPIASCN, THCURSCR, COCAINEUR, AMPHETSCREEN,  PROPOXSCN, BUPRENORSCNU, METAMPSCNUR, OXYCODONESCN, TRICYCLICSCN   Group B Strep Culture No results found for: GBSANTIGEN           External Prenatal Results     Pregnancy Outside Results - Transcribed From Office Records - See Scanned Records For Details     Test Value Date Time    Hgb 12.5 g/dL 19 1105    Hct 37.6 % 19 1105    ABO A  18 1647    Rh Positive  18 1647    Antibody Screen Negative  18 1647    Glucose Fasting GTT       Glucose Tolerance Test 1 hour       Glucose Tolerance Test 3 hour       Gonorrhea (discrete) Negative  18     Chlamydia (discrete) Negative  18     RPR       VDRL       Syphilis Antibody       Rubella       HBsAg Negative  18     Herpes Simplex Virus PCR       Herpes Simplex VIrus Culture       HIV       Hep C RNA Quant PCR       Hep C Antibody       AFP       Group B Strep       GBS Susceptibility to Clindamycin       GBS Susceptibility to Erythromycin       Fetal Fibronectin       Genetic Testing, Maternal Blood             Drug Screening     Test Value Date Time    Urine Drug Screen Negative  18     Amphetamine Screen Negative  18 1647    Barbiturate Screen Negative  18 1647    Benzodiazepine Screen Negative  18 1647    Methadone Screen Negative  18 1647    Phencyclidine Screen Negative  18 1647    Opiates Screen Negative  18 1647    THC Screen Negative  18 1647    Cocaine Screen       Propoxyphene Screen Negative  18 1647    Buprenorphine Screen Negative  18 1647    Methamphetamine Screen       Oxycodone Screen Negative  18 1647    Tricyclic Antidepressants Screen Negative  18 1647                  Past OB History:       Obstetric History       T0      L0     SAB1   TAB0   Ectopic0   Molar0   Multiple0   Live Births1       # Outcome Date GA Lbr Gary/2nd Weight Sex Delivery Anes PTL Lv   3 Current            2  18 21w5d 18:25 / 02:44 400 g  (14.1 oz) F Vag-Spont None Y ND      Name: REGINA DIAZ      Apgar1:  2   1 SAB 08/20/17 8w0d    SAB         Obstetric Comments   FOB #1 (): Pregnancy #1-3       Past Medical History: Past Medical History:   Diagnosis Date   • PCOS (polycystic ovarian syndrome)    • Urinary tract infection    • Urogenital trichomoniasis     treated many years ago      Past Surgical History Past Surgical History:   Procedure Laterality Date   • CHOLECYSTECTOMY     • EAR TUBES     • TONSILLECTOMY AND ADENOIDECTOMY        Family History: Family History   Problem Relation Age of Onset   • Coronary artery disease Father    • Hypertension Father    • Coronary artery disease Mother    • Diabetes Mother    • Hypertension Mother       Social History:  reports that she quit smoking about 2 years ago. Her smoking use included cigarettes. She has never used smokeless tobacco.   reports that she does not drink alcohol.   reports that she does not use drugs.                   General ROS Negative Findings:Headaches, Visual Changes, Epigastric pain, Anorexia, Nausia/Vomiting, ROM and Vaginal Bleeding    ROS     All other systems have been reviewed and are neg  Objective       Vital Signs Range for the last 24 hours  Temperature: Temp:  [98.2 °F (36.8 °C)-99.1 °F (37.3 °C)] 98.2 °F (36.8 °C)   Temp Source: Temp src: Oral   BP: BP: (109-142)/(56-85) 124/71   Pulse: Heart Rate:  [] 101   Respirations: Resp:  [16-18] 18   SPO2:     O2 Amount (l/min):     O2 Devices     Weight: Weight:  [101 kg (222 lb)] 101 kg (222 lb)     Physical Examination:   General:   alert, appears stated age and cooperative   Skin:   normal   HEENT:  Sclera clear   Lungs:   clear to auscultation bilaterally   Heart:   regular rate and rhythm, S1, S2 normal, no murmur, click, rub or gallop   Abdomen:  Soft, gravid uterus nontender, no guarding, rebound, negative CVA tenderness   Lower Extremities No edema, no calf tenderness   Pelvis:  Exam deferred.          Presentation:    Cervix: Exam by:     Dilation:     Effacement:     Station:         Fetal Heart Rate Assessment   Method:     Beats/min:     Baseline:     Varibility:     Accels:     Decels:     Tracing Category:       Uterine Assessment   Method: Method: palpation, per patient report   Frequency (min):     Ctx Count in 10 min:     Duration:     Intensity: Contraction Intensity: no contractions   Intensity by IUPC:     Resting Tone: Uterine Resting Tone: soft by palpation   Resting Tone by IUPC:     Watkins Units:       Laboratory Results:   Lab Results (last 24 hours)     Procedure Component Value Units Date/Time    Preeclampsia Panel [[id number]] Collected:  19 104    Specimen:  Blood Updated:  19 1146    CBC (No Diff) [[id number]]  (Normal) Collected:  19    Specimen:  Blood Updated:  19 1101     WBC 7.02 10*3/mm3      RBC 4.18 10*6/mm3      Hemoglobin 12.4 g/dL      Hematocrit 37.1 %      MCV 88.8 fL      MCH 29.7 pg      MCHC 33.4 g/dL      RDW 13.6 %      RDW-SD 44.1 fl      MPV 10.2 fL      Platelets 259 10*3/mm3         Radiology Review:   Imaging Results (last 24 hours)     ** No results found for the last 24 hours. **        Other Studies:    Assessment/Plan       Previous  delivery in second trimester, antepartum    Morbid obesity (CMS/[facility])    Pregnancy    Incompetent cervix        Assessment:  1.  Intrauterine pregnancy at 22w1d weeks gestation with reassuring fetal status.    2.  Cervical incompetence without evidence of labor or infection  3.  History of a midtrimester loss  4.  Obesity  5.  Unable to tolerate Lamar  6.  History of PCOS    Plan:  1.  Admit, magnesium sulfate tocolysis, antibiotics, n.p.o. after midnight reevaluate tomorrow for possible cervical cerclage by MFM, NICU consult.   2. Plan of care has been reviewed with patient.  3.  Risks, benefits of treatment plan have been discussed.  4.  All questions have been answered.  Lilly BUCKLEY  DO Quynh  5/2/2019  12:04 PM

## 2019-05-02 NOTE — PROGRESS NOTES
"Pt denies lof or cramping. States she feels like her baby is really low and has feelings of fullness. States she has been working long hours and lifting \"heavy\" things at work. States she has not taken her Amberly injections for about 3 weeks because she felt like the med was changing her mood and making her feel down. States she told Dr Prater, and he said she could hold off on the med for a bit if it is altering her mood.   "

## 2019-05-03 ENCOUNTER — ANESTHESIA (OUTPATIENT)
Dept: LABOR AND DELIVERY | Facility: HOSPITAL | Age: 29
End: 2019-05-03

## 2019-05-03 ENCOUNTER — ANESTHESIA EVENT (OUTPATIENT)
Dept: LABOR AND DELIVERY | Facility: HOSPITAL | Age: 29
End: 2019-05-03

## 2019-05-03 LAB — BACTERIA SPEC AEROBE CULT: NO GROWTH

## 2019-05-03 PROCEDURE — 25010000002 MIDAZOLAM PER 1 MG: Performed by: NURSE ANESTHETIST, CERTIFIED REGISTERED

## 2019-05-03 PROCEDURE — 25010000002 ONDANSETRON PER 1 MG: Performed by: OBSTETRICS & GYNECOLOGY

## 2019-05-03 PROCEDURE — 0UVC7ZZ RESTRICTION OF CERVIX, VIA NATURAL OR ARTIFICIAL OPENING: ICD-10-PCS | Performed by: OBSTETRICS & GYNECOLOGY

## 2019-05-03 PROCEDURE — 25010000002 PHENYLEPHRINE PER 1 ML: Performed by: NURSE ANESTHETIST, CERTIFIED REGISTERED

## 2019-05-03 PROCEDURE — 25010000003 CEFAZOLIN 1-4 GM/50ML-% SOLUTION: Performed by: OBSTETRICS & GYNECOLOGY

## 2019-05-03 PROCEDURE — 25010000002 FENTANYL CITRATE (PF) 100 MCG/2ML SOLUTION: Performed by: NURSE ANESTHETIST, CERTIFIED REGISTERED

## 2019-05-03 PROCEDURE — 25010000002 MAGNESIUM SULFATE-LACT RINGERS 40 GM/580ML SOLUTION: Performed by: OBSTETRICS & GYNECOLOGY

## 2019-05-03 PROCEDURE — 59320 REVISION OF CERVIX: CPT | Performed by: OBSTETRICS & GYNECOLOGY

## 2019-05-03 RX ORDER — FENTANYL CITRATE 50 UG/ML
INJECTION, SOLUTION INTRAMUSCULAR; INTRAVENOUS AS NEEDED
Status: DISCONTINUED | OUTPATIENT
Start: 2019-05-03 | End: 2019-05-03 | Stop reason: SURG

## 2019-05-03 RX ORDER — TRISODIUM CITRATE DIHYDRATE AND CITRIC ACID MONOHYDRATE 500; 334 MG/5ML; MG/5ML
30 SOLUTION ORAL ONCE
Status: COMPLETED | OUTPATIENT
Start: 2019-05-03 | End: 2019-05-03

## 2019-05-03 RX ORDER — OXYCODONE HYDROCHLORIDE AND ACETAMINOPHEN 5; 325 MG/1; MG/1
1 TABLET ORAL EVERY 4 HOURS PRN
Status: DISCONTINUED | OUTPATIENT
Start: 2019-05-03 | End: 2019-05-05 | Stop reason: HOSPADM

## 2019-05-03 RX ORDER — BUPIVACAINE HYDROCHLORIDE 7.5 MG/ML
INJECTION, SOLUTION EPIDURAL; RETROBULBAR AS NEEDED
Status: DISCONTINUED | OUTPATIENT
Start: 2019-05-03 | End: 2019-05-03 | Stop reason: SURG

## 2019-05-03 RX ORDER — PRENATAL VIT/IRON FUM/FOLIC AC 27MG-0.8MG
1 TABLET ORAL NIGHTLY
Status: DISCONTINUED | OUTPATIENT
Start: 2019-05-03 | End: 2019-05-05 | Stop reason: HOSPADM

## 2019-05-03 RX ORDER — SODIUM CHLORIDE, SODIUM LACTATE, POTASSIUM CHLORIDE, CALCIUM CHLORIDE 600; 310; 30; 20 MG/100ML; MG/100ML; MG/100ML; MG/100ML
125 INJECTION, SOLUTION INTRAVENOUS CONTINUOUS
Status: DISCONTINUED | OUTPATIENT
Start: 2019-05-03 | End: 2019-05-03 | Stop reason: HOSPADM

## 2019-05-03 RX ORDER — FAMOTIDINE 10 MG/ML
INJECTION, SOLUTION INTRAVENOUS AS NEEDED
Status: DISCONTINUED | OUTPATIENT
Start: 2019-05-03 | End: 2019-05-03 | Stop reason: SURG

## 2019-05-03 RX ORDER — AZITHROMYCIN 250 MG/1
500 TABLET, FILM COATED ORAL
Status: DISCONTINUED | OUTPATIENT
Start: 2019-05-03 | End: 2019-05-05 | Stop reason: HOSPADM

## 2019-05-03 RX ORDER — DOCUSATE SODIUM 100 MG/1
100 CAPSULE, LIQUID FILLED ORAL 2 TIMES DAILY
Status: DISCONTINUED | OUTPATIENT
Start: 2019-05-03 | End: 2019-05-05 | Stop reason: HOSPADM

## 2019-05-03 RX ORDER — MIDAZOLAM HYDROCHLORIDE 1 MG/ML
INJECTION INTRAMUSCULAR; INTRAVENOUS AS NEEDED
Status: DISCONTINUED | OUTPATIENT
Start: 2019-05-03 | End: 2019-05-03 | Stop reason: SURG

## 2019-05-03 RX ADMIN — SODIUM CHLORIDE, POTASSIUM CHLORIDE, SODIUM LACTATE AND CALCIUM CHLORIDE: 600; 310; 30; 20 INJECTION, SOLUTION INTRAVENOUS at 07:59

## 2019-05-03 RX ADMIN — FENTANYL CITRATE 20 MCG: 50 INJECTION, SOLUTION INTRAMUSCULAR; INTRAVENOUS at 07:52

## 2019-05-03 RX ADMIN — PHENYLEPHRINE HYDROCHLORIDE 100 MCG: 10 INJECTION INTRAVENOUS at 07:58

## 2019-05-03 RX ADMIN — SODIUM CHLORIDE, POTASSIUM CHLORIDE, SODIUM LACTATE AND CALCIUM CHLORIDE 125 ML/HR: 600; 310; 30; 20 INJECTION, SOLUTION INTRAVENOUS at 09:23

## 2019-05-03 RX ADMIN — SODIUM CHLORIDE, POTASSIUM CHLORIDE, SODIUM LACTATE AND CALCIUM CHLORIDE 1000 ML/HR: 600; 310; 30; 20 INJECTION, SOLUTION INTRAVENOUS at 07:20

## 2019-05-03 RX ADMIN — OXYCODONE AND ACETAMINOPHEN 1 TABLET: 5; 325 TABLET ORAL at 11:33

## 2019-05-03 RX ADMIN — ONDANSETRON 4 MG: 2 INJECTION INTRAMUSCULAR; INTRAVENOUS at 07:46

## 2019-05-03 RX ADMIN — BUPIVACAINE HYDROCHLORIDE 1.3 ML: 7.5 INJECTION, SOLUTION EPIDURAL; RETROBULBAR at 07:52

## 2019-05-03 RX ADMIN — OXYCODONE AND ACETAMINOPHEN 1 TABLET: 5; 325 TABLET ORAL at 15:09

## 2019-05-03 RX ADMIN — FAMOTIDINE 20 MG: 10 INJECTION, SOLUTION INTRAVENOUS at 08:06

## 2019-05-03 RX ADMIN — CEFAZOLIN SODIUM 1 G: 1 INJECTION, SOLUTION INTRAVENOUS at 20:40

## 2019-05-03 RX ADMIN — DEXTROSE AND SODIUM CHLORIDE 96 ML/HR: 5; 200 INJECTION, SOLUTION INTRAVENOUS at 10:01

## 2019-05-03 RX ADMIN — ACETAMINOPHEN 1000 MG: 500 TABLET, FILM COATED ORAL at 23:37

## 2019-05-03 RX ADMIN — PHENYLEPHRINE HYDROCHLORIDE 100 MCG: 10 INJECTION INTRAVENOUS at 08:01

## 2019-05-03 RX ADMIN — ACETAMINOPHEN 1000 MG: 500 TABLET, FILM COATED ORAL at 03:24

## 2019-05-03 RX ADMIN — PRENATAL VIT W/ FE FUMARATE-FA TAB 27-0.8 MG 1 TABLET: 27-0.8 TAB at 21:32

## 2019-05-03 RX ADMIN — DOCUSATE SODIUM 100 MG: 100 CAPSULE, LIQUID FILLED ORAL at 21:32

## 2019-05-03 RX ADMIN — DEXTROSE AND SODIUM CHLORIDE 100 ML/HR: 5; 200 INJECTION, SOLUTION INTRAVENOUS at 20:17

## 2019-05-03 RX ADMIN — CEFAZOLIN SODIUM 1 G: 1 INJECTION, SOLUTION INTRAVENOUS at 06:51

## 2019-05-03 RX ADMIN — Medication 2 G/HR: at 03:25

## 2019-05-03 RX ADMIN — AZITHROMYCIN 500 MG: 250 TABLET, FILM COATED ORAL at 12:06

## 2019-05-03 RX ADMIN — SODIUM CITRATE AND CITRIC ACID MONOHYDRATE 30 ML: 500; 334 SOLUTION ORAL at 07:36

## 2019-05-03 RX ADMIN — CEFAZOLIN SODIUM 1 G: 1 INJECTION, SOLUTION INTRAVENOUS at 13:28

## 2019-05-03 RX ADMIN — MIDAZOLAM HYDROCHLORIDE 1 MG: 1 INJECTION, SOLUTION INTRAMUSCULAR; INTRAVENOUS at 07:53

## 2019-05-03 NOTE — ANESTHESIA PROCEDURE NOTES
Spinal Block      Patient reassessed immediately prior to procedure    Patient location during procedure: OR  Indication:at surgeon's request  Performed By  Anesthesiologist: Georgie Ha DO  CRNA: Mandy Gambino CRNA  Preanesthetic Checklist  Completed: patient identified, surgical consent, pre-op evaluation, timeout performed, IV checked, risks and benefits discussed and monitors and equipment checked  Spinal Block Prep:  Patient Position:sitting  Sterile Tech:cap, gloves, mask and sterile barriers  Prep:Betadine  Patient Monitoring:blood pressure monitoring, continuous pulse oximetry and EKG  Spinal Block Procedure  Approach:midline  Guidance:palpation technique  Location:L3-L4  Needle Type:Twyla  Needle Gauge:25 G  Placement of Spinal needle event:cerebrospinal fluid aspirated  Paresthesia: no  Fluid Appearance:clear     Post Assessment  Patient Tolerance:patient tolerated the procedure well with no apparent complications  Complications no

## 2019-05-03 NOTE — OP NOTE
[facility]  Cerclage Operative Note    Chief Complaint   Patient presents with   • cervical funneling     r/o sol, incomp cervix       Pre-Operative Dx:   1.  IUP at 22 weeks   2. Incompetent Cervix      Postoperative dx:    1.  Same     Procedure: Procedure(s):  CERVICAL CERCLAGE   Surgeon: Milligan   Assistant:    Anesthesia: SAB   EBL:   50 mls.                 Antibiotics: cefazolin (Ancef) and zithromax         Procedure Details:   Indication: Patient was a 22 weeks gestation.  She was being followed by  diagnostic center due to a history of a 21-week loss of uncertain etiology.  Her most recent ultrasound demonstrated a cervix with marked funneling and shortening with funneling to nearly the external loss.  She was counseled regarding her options including bedrest or emergency cerclage and she elected to proceed with cerclage.    Procedure: Patient was brought in the operating room placed on the operating table has subarachnoid block placed by the anesthesia service.  She was then placed in high lithotomy position.  External vagina and perineum were prepped normally and the patient was draped.  We did not perform an internal scrub as we were concerned about hourglassing membranes.  Weighted speculum was placed in the placed in the vagina and the vagina was copiously irrigated with nearly a liter of sterile saline.  At this time it was apparent that the membranes were not through the external loss.  The anterior lip of the cervix was grasped with a ring forcep and a 5 mm Mersilene band was placed in pursestring fashion as high as possible about cervix.  There appeared to be good purchase around the entire cervix.  The cerclage was then tied down over a loop of 0 Prolene to aid in removal of the cerclage.  There appeared to be good approximation of the endocervical canal.  The procedure was terminated at this point and the instrument were removed from the the vagina the bladder was emptied of  approximately 250 mL's of clear urine and the patient was taken down out of high lithotomy position.  Patient was transferred to hospital been transferred to recovery room awake and in stable condition.  There were no complications.  Estimated blood loss was 50 mL.     Drains: none     Complications:   None      Disposition:   Mother to recovery room in stable condition.          Douglas A. Milligan, MD  5/3/2019  8:20 AM

## 2019-05-03 NOTE — PLAN OF CARE
Problem: Cervical Cerclage for Cervical Insufficiency (Adult,Obstetrics,Pediatric)  Goal: Signs and Symptoms of Listed Potential Problems Will be Absent, Minimized or Managed (Cervical Cerclage for Cervical Insufficiency)  Outcome: Ongoing (interventions implemented as appropriate)

## 2019-05-03 NOTE — PAYOR COMM NOTE
"Margie Diaz (28 y.o. Female)   Inpatient Clinicals      Date of Birth Social Security Number Address Home Phone MRN    1990  [facility] [place] [address]  [id number]    [employer] Marital Status          None Single       Admission Date Admission Type Admitting Provider Attending Provider Department, Room/Bed    5/2/19 Elective Kavitha Prater MD Parrott, Olson, MD [facility] LABOR DELIVERY, LTP4/4    Discharge Date Discharge Disposition Discharge Destination                       Attending Provider:  Kavitha Prater MD    Allergies:  Penicillins    Isolation:  None   Infection:  None   Code Status:  CPR    Ht:  162.6 cm (64\")   Wt:  101 kg (222 lb)    Admission Cmt:  None   Principal Problem:  None                Active Insurance as of 5/2/2019     Primary Coverage     Payor Plan Insurance Group Employer/Plan Group    HUMANA MEDICAID HU[employer] [id number]     Payor Plan Address Payor Plan Phone Number Payor Plan Fax Number Effective Dates    PO  [phone]  8/20/2017 - None Entered    [facility] [address]       Subscriber Name Subscriber Birth Date Member ID       MARGIE DIAZ 1990 [id number]                 Emergency Contacts      (Rel.) Home Phone Work Phone Mobile Phone    Veronica Chowdary (Mother) [phone] -- --            Insurance Information                [employer] MEDICAID/[employer]NA [employer] Phone: [phone]    Subscriber: Margie Diaz Subscriber#: [id number]    Group#: [id number] Precert#:           Treatment Team     Provider Relationship Specialty Contact    Kavitha Prater MD Attending -- [phone]    Conchis Arias RD Dietitian Nutrition [phone]    Milligan, Douglas A, MD Surgeon Maternal and Fetal Medicine [phone]    Adriane Haro RN Registered Nurse -- 1407    Rylee Hahn RN Registered Nurse --           Problem List           Codes Noted - Resolved       Hospital    Incompetent cervix ICD-10-CM: " N88.3  ICD-9-CM: 622.5 2019 - Present    Morbid obesity (CMS/HCC) ICD-10-CM: E66.01  ICD-9-CM: 278.01 3/21/2019 - Present    Pregnancy ICD-10-CM: Z34.90  ICD-9-CM: V22.2 3/21/2019 - Present    Previous  delivery in second trimester, antepartum ICD-10-CM: O09.212  ICD-9-CM: V23.41 3/7/2019 - Present       Non-Hospital    BMI 37.0-37.9, adult ICD-10-CM: Z68.37  ICD-9-CM: V85.37 3/7/2019 - Present    Low back pain during pregnancy in second trimester ICD-10-CM: O26.892, M54.5  ICD-9-CM: 646.80, 724.2 3/18/2018 - Present             History & Physical      Malick Beauchamp,  at 2019 10:31 AM          \[facility]  Obstetric History and Physical    Referring Provider: Kavitha Prater MD      Chief Complaint   Patient presents with   • hourglass membranes       Subjective     Patient is a 28 y.o. female  currently at 22w1d, who presents from  diagnostic center with incompetent cervix.  Patient was seen today at the  diagnostic center for fetal surveillance and a history of midtrimester loss.  PD C ultrasound noted size consistent with dates, no fetal anomalies identified, no fetal markers for trisomy, and the cervix appeared funneled to external os.  Patient denies abdominal pain, leaking of fluid, vaginal bleeding, recent trauma any associated symptoms or concerns.  Patient reports normal fetal activity.  No care by Dr. Prater complicated by prior pregnancy ended in a midtrimester loss.  Patient was on Lamar, discontinued secondary to intolerance.    Her prenatal care is complicated by  cervical incompetence.  Her previous obstetric/gynecological history is noted for is remarkable for .    The following portions of the patients history were reviewed and updated as appropriate: current medications, allergies, past medical history, past surgical history, past family history, past social history and problem list .       Prenatal Information:   Maternal Prenatal Labs  Blood Type  No results found for: ABO   Rh Status No results found for: RH   Antibody Screen No results found for: ABSCRN   Gonnorhea No results found for: GCCX   Chlamydia No results found for: CLAMYDCU   RPR No results found for: RPR   Syphilis Antibody No results found for: SYPHILIS   Rubella No results found for: RUBELLAIGGIN   Hepatitis B Surface Antigen No results found for: HEPBSAG   HIV-1 Antibody No results found for: LABHIV1   Hepatitis C Antibody No results found for: HEPCAB   Rapid Urin Drug Screen No results found for: AMPMETHU, BARBITSCNUR, LABBENZSCN, LABMETHSCN, LABOPIASCN, THCURSCR, COCAINEUR, AMPHETSCREEN, PROPOXSCN, BUPRENORSCNU, METAMPSCNUR, OXYCODONESCN, TRICYCLICSCN   Group B Strep Culture No results found for: GBSANTIGEN           External Prenatal Results     Pregnancy Outside Results - Transcribed From Office Records - See Scanned Records For Details     Test Value Date Time    Hgb 12.5 g/dL 03/30/19 1105    Hct 37.6 % 03/30/19 1105    ABO A  03/25/18 1647    Rh Positive  03/25/18 1647    Antibody Screen Negative  03/25/18 1647    Glucose Fasting GTT       Glucose Tolerance Test 1 hour       Glucose Tolerance Test 3 hour       Gonorrhea (discrete) Negative  01/18/18     Chlamydia (discrete) Negative  01/18/18     RPR       VDRL       Syphilis Antibody       Rubella       HBsAg Negative  01/18/18     Herpes Simplex Virus PCR       Herpes Simplex VIrus Culture       HIV       Hep C RNA Quant PCR       Hep C Antibody       AFP       Group B Strep       GBS Susceptibility to Clindamycin       GBS Susceptibility to Erythromycin       Fetal Fibronectin       Genetic Testing, Maternal Blood             Drug Screening     Test Value Date Time    Urine Drug Screen Negative  01/18/18     Amphetamine Screen Negative  03/25/18 1647    Barbiturate Screen Negative  03/25/18 1647    Benzodiazepine Screen Negative  03/25/18 1647    Methadone Screen Negative  03/25/18 1647    Phencyclidine Screen Negative  03/25/18 1647     Opiates Screen Negative  18 1647    THC Screen Negative  18 1647    Cocaine Screen       Propoxyphene Screen Negative  18 1647    Buprenorphine Screen Negative  18 1647    Methamphetamine Screen       Oxycodone Screen Negative  18 1647    Tricyclic Antidepressants Screen Negative  18 1647                  Past OB History:       Obstetric History       T0      L0     SAB1   TAB0   Ectopic0   Molar0   Multiple0   Live Births1       # Outcome Date GA Lbr Gary/2nd Weight Sex Delivery Anes PTL Lv   3 Current            2  18 21w5d 18:25 / 02:44 400 g (14.1 oz) F Vag-Spont None Y ND      Name: REGINA DIAZ      Apgar1:  2   1 SAB 17 8w0d    SAB         Obstetric Comments   FOB #1 (): Pregnancy #1-3       Past Medical History: Past Medical History:   Diagnosis Date   • PCOS (polycystic ovarian syndrome)    • Urinary tract infection    • Urogenital trichomoniasis     treated many years ago      Past Surgical History Past Surgical History:   Procedure Laterality Date   • CHOLECYSTECTOMY     • EAR TUBES     • TONSILLECTOMY AND ADENOIDECTOMY        Family History: Family History   Problem Relation Age of Onset   • Coronary artery disease Father    • Hypertension Father    • Coronary artery disease Mother    • Diabetes Mother    • Hypertension Mother       Social History:  reports that she quit smoking about 2 years ago. Her smoking use included cigarettes. She has never used smokeless tobacco.   reports that she does not drink alcohol.   reports that she does not use drugs.                   General ROS Negative Findings:Headaches, Visual Changes, Epigastric pain, Anorexia, Nausia/Vomiting, ROM and Vaginal Bleeding    ROS     All other systems have been reviewed and are neg  Objective       Vital Signs Range for the last 24 hours  Temperature: Temp:  [98.2 °F (36.8 °C)-99.1 °F (37.3 °C)] 98.2 °F (36.8 °C)   Temp Source: Temp src: Oral   BP:  BP: (109-142)/(56-85) 124/71   Pulse: Heart Rate:  [] 101   Respirations: Resp:  [16-18] 18   SPO2:     O2 Amount (l/min):     O2 Devices     Weight: Weight:  [101 kg (222 lb)] 101 kg (222 lb)     Physical Examination:   General:   alert, appears stated age and cooperative   Skin:   normal   HEENT:  Sclera clear   Lungs:   clear to auscultation bilaterally   Heart:   regular rate and rhythm, S1, S2 normal, no murmur, click, rub or gallop   Abdomen:  Soft, gravid uterus nontender, no guarding, rebound, negative CVA tenderness   Lower Extremities No edema, no calf tenderness   Pelvis:  Exam deferred.         Presentation:    Cervix: Exam by:     Dilation:     Effacement:     Station:         Fetal Heart Rate Assessment   Method:     Beats/min:     Baseline:     Varibility:     Accels:     Decels:     Tracing Category:       Uterine Assessment   Method: Method: palpation, per patient report   Frequency (min):     Ctx Count in 10 min:     Duration:     Intensity: Contraction Intensity: no contractions   Intensity by IUPC:     Resting Tone: Uterine Resting Tone: soft by palpation   Resting Tone by IUPC:     Rosebud Units:       Laboratory Results:   Lab Results (last 24 hours)     Procedure Component Value Units Date/Time    Preeclampsia Panel [[id number]] Collected:  19 104    Specimen:  Blood Updated:  19 1146    CBC (No Diff) [[id number]]  (Normal) Collected:  19 104    Specimen:  Blood Updated:  19 1101     WBC 7.02 10*3/mm3      RBC 4.18 10*6/mm3      Hemoglobin 12.4 g/dL      Hematocrit 37.1 %      MCV 88.8 fL      MCH 29.7 pg      MCHC 33.4 g/dL      RDW 13.6 %      RDW-SD 44.1 fl      MPV 10.2 fL      Platelets 259 10*3/mm3         Radiology Review:   Imaging Results (last 24 hours)     ** No results found for the last 24 hours. **        Other Studies:    Assessment/Plan       Previous  delivery in second trimester, antepartum    Morbid obesity (CMS/HCC)    Pregnancy     Incompetent cervix        Assessment:  1.  Intrauterine pregnancy at 22w1d weeks gestation with reassuring fetal status.    2.  Cervical incompetence without evidence of labor or infection  3.  History of a midtrimester loss  4.  Obesity  5.  Unable to tolerate Lamar  6.  History of PCOS    Plan:  1.  Admit, magnesium sulfate tocolysis, antibiotics, n.p.o. after midnight reevaluate tomorrow for possible cervical cerclage by MFM, NICU consult.   2. Plan of care has been reviewed with patient.  3.  Risks, benefits of treatment plan have been discussed.  4.  All questions have been answered.  5      Malick Beauchamp DO  5/2/2019  12:04 PM    Electronically signed by Malick Beauchamp DO at 5/2/2019 12:05 PM     H&P filed by [employer] Onbase, [place] at 5/2/2019 10:38 AM     Scan on 5/2/2019: PRENATAL RECORDS 05/02/2019 (below)            Electronically signed by Interface, Scans Incoming at 5/2/2019 10:38 AM       ICU Vital Signs     Row Name 05/03/19 0900 05/03/19 0855 05/03/19 0850 05/03/19 0845 05/03/19 0840       Vitals    Pulse  84  85  87  79  84    BP  113/66  110/58  105/64  99/57  90/58    Noninvasive MAP (mmHg)  81  76  73  69  70       Oxygen Therapy    SpO2  99 %  99 %  99 %  99 %  97 %    Row Name 05/03/19 0835 05/03/19 0830 05/03/19 0826 05/03/19 0825 05/03/19 0824       Vitals    Pulse  74  79  73  69  71    BP  88/46  (Abnormal)   91/46  --  94/50  --    Noninvasive MAP (mmHg)  58  62  --  63  --       Oxygen Therapy    SpO2  97 %  96 %  91 %  97 %  95 %       Patient Observation    Observations  ODILON Weinberg @   --  --  --  --    Row Name 05/03/19 0823 05/03/19 0822 05/03/19 0821 05/03/19 0820 05/03/19 0819       Vitals    Pulse  73  79  75  73  75    BP  94/60  94/60  --  --  --    Noninvasive MAP (mmHg)  68  68  --  --  --       Oxygen Therapy    SpO2  92 %  97 %  99 %  98 %  97 %    Row Name 05/03/19 0816 05/03/19 0722 05/03/19 0701 05/03/19 0614 05/03/19 0607       Vitals    Temp  97.6 °F  (36.4 °C)  97.7 °F (36.5 °C)  --  --  --    Temp src  Oral  Oral  --  --  --    Pulse  75  100  --  88  --    Heart Rate Source  Monitor  Monitor  --  --  --    Resp  18  16  18  --  18    Resp Rate Source  Visual  Visual  Visual  --  Visual    BP  80/54  (Abnormal)   123/79  --  100/53  --    BP Location  Right arm  Right arm  --  --  --    BP Method  Automatic  Automatic  --  --  --    Patient Position  Lying  Lying  --  --  --       Oxygen Therapy    SpO2  97 %  --  --  --  --    Pulse Oximetry Type  Continuous  --  --  --  --    Device (Oxygen Therapy)  room air  --  --  --  --       Patient Observation    Observations  PWD.  c/o feeling SOA & heavy in chest - BP low, anesthesia @ BS & treating.  No pain  PWD, anxious.  Denies pain.    --  --  --    Row Name 05/03/19 0514 05/03/19 0509 05/03/19 0413 05/03/19 0323 05/03/19 0213       Vitals    Temp  --  --  97.6 °F (36.4 °C)  --  --    Temp src  --  --  Oral  --  --    Pulse  85  --  82  84  83    Heart Rate Source  --  --  Monitor  --  --    Resp  --  18  16  16  --    Resp Rate Source  --  Visual  Visual  Visual  --    BP  83/48  (Abnormal)   --  100/57  103/52  110/58    BP Location  --  --  Left arm  --  --    BP Method  --  --  Automatic  --  --    Patient Position  --  --  Lying  --  --    Row Name 05/03/19 0145 05/03/19 0115 05/03/19 0114 05/03/19 0015 05/03/19 0014       Vitals    Temp  --  --  --  --  97.9 °F (36.6 °C)    Temp src  --  --  --  --  Oral    Pulse  --  --  88  --  87    Heart Rate Source  --  --  --  --  Monitor    Resp  --  18  --  18  18    Resp Rate Source  --  Visual  --  Visual  Visual    BP  --  --  106/59  --  88/52  (Abnormal)     BP Location  --  --  --  --  Left arm    BP Method  --  --  --  --  Automatic    Patient Position  --  --  --  --  Lying       Patient Observation    Observations  pt removed toco, refused to wear   --  --  --  --    Row Name 05/02/19 2315 05/02/19 2213 05/02/19 2113 05/02/19 1928 05/02/19 182        "Vitals    Temp  --  --  --  98.5 °F (36.9 °C)  --    Temp src  --  --  --  Oral  --    Pulse  87  85  93  99  --    Heart Rate Source  --  --  --  Monitor  --    Resp  18  --  18  16  --    Resp Rate Source  Visual  --  Visual  Visual  --    BP  99/58  110/63  103/56  127/72  --    BP Location  --  --  --  Left arm  --    BP Method  --  --  --  Automatic  --    Patient Position  --  --  --  Sitting  --       Patient Observation    Observations  --  --  --  --  ICU RN @ bedside attempting IV    Row Name 05/02/19 1709 05/02/19 1617 05/02/19 1601 05/02/19 1537 05/02/19 1442       Vitals    Temp  98.3 °F (36.8 °C)  --  --  --  --    Temp src  Oral  --  --  --  --    Pulse  100  98  --  94  97    Heart Rate Source  Monitor  --  --  --  --    Resp  16  16  --  16  16    Resp Rate Source  Visual  Visual  --  Visual  Visual    BP  118/76  122/72  --  102/59  128/68    BP Location  Left arm  --  --  --  --    BP Method  Automatic  --  --  --  --    Patient Position  Lying  --  --  --  --       Patient Observation    Observations  --  --  pt c/o dizziness and blurry vision; RN at bedside  --  --    Row Name 05/02/19 1433 05/02/19 1313 05/02/19 1224 05/02/19 1123 05/02/19 1118       Vitals    Temp  --  --  98.3 °F (36.8 °C)  98.2 °F (36.8 °C)  --    Temp src  --  --  Oral  Oral  --    Pulse  94  100  --  101  96    Heart Rate Source  --  Monitor  --  Monitor  --    Resp  --  16  16 18  16    Resp Rate Source  --  Visual  Visual  Visual  Visual    BP  120/61  116/57  --  124/71  121/62    BP Location  --  --  Left arm  Left arm  --    BP Method  --  --  Automatic  Automatic  --    Patient Position  --  --  Lying  Lying  --    Row Name 05/02/19 1113 05/02/19 1108 05/02/19 1100 05/02/19 1006 05/02/19 1003       Height and Weight    Height  --  --  --  162.6 cm (64\")  --    Height Method  --  --  --  Stated  --    Weight  --  --  --  101 kg (222 lb)  --    Weight Method  --  --  --  Stated  --    Ideal Body Weight (IBW) (kg)  --  " "--  --  55  --    BSA (Calculated - sq m)  --  --  --  2.04 sq meters  --    BMI (Calculated)  --  --  --  38.1  --    Weight in (lb) to have BMI = 25  --  --  --  145.3  --       Vitals    Temp  --  --  98.5 °F (36.9 °C)  --  --    Temp src  --  --  Oral  --  --    Pulse  93  93  90  --  101    Heart Rate Source  --  --  Monitor  --  --    Resp  16  16  16  --  --    Resp Rate Source  Visual  Visual  Visual  --  --    BP  117/61  109/56  118/70  --  132/83    BP Location  --  --  Left arm  --  --    BP Method  --  --  Automatic  --  --    Patient Position  --  --  Lying  --  --       Patient Observation    Observations  --  --  Mag bolus started  --  --    Row Name 05/02/19 0957 05/02/19 0918                Height and Weight    Weight  --  101 kg (222 lb)          Vitals    Temp  99.1 °F (37.3 °C)  --       Temp src  Oral  --       Pulse  103  --       Heart Rate Source  Monitor  --       Resp  18  --       Resp Rate Source  Visual  --       BP  142/85  126/76       BP Location  Right arm  --       BP Method  Automatic  --       Patient Position  Lying  --           Hospital Medications (all)       Dose Frequency Start End    acetaminophen (TYLENOL) tablet 1,000 mg 1,000 mg 4 Times Daily PRN 5/2/2019     Sig - Route: Take 2 tablets by mouth 4 (Four) Times a Day As Needed for Mild Pain . - Oral    azithromycin (ZITHROMAX) tablet 1,000 mg 1,000 mg Once 5/2/2019 5/2/2019    Sig - Route: Take 4 tablets by mouth 1 (One) Time. - Oral    Linked Group 1:  \"And\" Linked Group Details        ceFAZolin (ANCEF) IVPB 1 g 1 g Every 8 Hours 5/2/2019 5/4/2019    Sig - Route: Infuse 50 mL into a venous catheter Every 8 (Eight) Hours. - Intravenous    Linked Group 1:  \"And\" Linked Group Details        cephalexin (KEFLEX) capsule 500 mg 500 mg Every 6 Hours 5/4/2019 5/9/2019    Sig - Route: Take 2 capsules by mouth Every 6 (Six) Hours. - Oral    Linked Group 1:  \"And\" Linked Group Details        dextrose 5 % and sodium chloride 0.2 % " infusion 100 mL/hr Continuous 5/2/2019     Sig - Route: Infuse 100 mL/hr into a venous catheter Continuous. - Intravenous    docusate sodium (COLACE) capsule 100 mg 100 mg Daily PRN 5/2/2019     Sig - Route: Take 1 capsule by mouth Daily As Needed for Constipation. - Oral    lactated ringers infusion 125 mL/hr Continuous 5/3/2019     Sig - Route: Infuse 125 mL/hr into a venous catheter Continuous. - Intravenous    lidocaine PF 1% (XYLOCAINE) injection 5 mL 5 mL As Needed 5/2/2019     Sig - Route: Inject 5 mL into the appropriate area of the skin as directed by provider As Needed (IV starts). - Intradermal    magnesium sulfate bolus from bag 0.07 g/mL solution 4 g 4 g Once 5/2/2019 5/2/2019    Sig - Route: Infuse 57.14 mL into a venous catheter 1 (One) Time. - Intravenous    Magnesium Sulfate-Lact Ringers 40 GM/580ML  - ADS Override Pull   5/2/2019 5/2/2019    Notes to Pharmacy: Created by cabinet override    Magnesium Sulfate-Lact Ringers 40 GM/580ML 2 g/hr Continuous 5/2/2019 5/5/2019    Sig - Route: Infuse 2 g/hr into a venous catheter Continuous. - Intravenous    ondansetron (ZOFRAN) injection 4 mg 4 mg Every 6 Hours PRN 5/2/2019     Sig - Route: Infuse 2 mL into a venous catheter Every 6 (Six) Hours As Needed for Nausea or Vomiting. - Intravenous    prenatal vitamin 27-0.8 tablet 1 tablet 1 tablet Daily 5/2/2019     Sig - Route: Take 1 tablet by mouth Daily. - Oral    Sod Citrate-Citric Acid (BICITRA) solution 30 mL 30 mL Once 5/3/2019 5/3/2019    Sig - Route: Take 30 mL by mouth 1 (One) Time. - Oral    sodium chloride 0.9 % flush 1-10 mL 1-10 mL As Needed 5/2/2019     Sig - Route: Infuse 1-10 mL into a venous catheter As Needed for Line Care. - Intravenous    sodium chloride 0.9 % flush 3 mL 3 mL Every 12 Hours Scheduled 5/2/2019     Sig - Route: Infuse 3 mL into a venous catheter Every 12 (Twelve) Hours. - Intravenous    amoxicillin (AMOXIL) capsule 500 mg (Discontinued) 500 mg Every 8 Hours 5/4/2019 5/2/2019  "   Sig - Route: Take 2 capsules by mouth Every 8 (Eight) Hours. - Oral    Linked Group 2:  \"And\" Linked Group Details        ampicillin 2 g/100 mL 0.9% NS (MBP) (Discontinued) 2 g Every 6 Hours 5/2/2019 5/2/2019    Sig - Route: Infuse 100 mL into a venous catheter Every 6 (Six) Hours. - Intravenous    Linked Group 2:  \"And\" Linked Group Details        azithromycin (ZITHROMAX) tablet 1,000 mg (Discontinued) 1,000 mg Once 5/2/2019 5/2/2019    Sig - Route: Take 4 tablets by mouth 1 (One) Time. - Oral    Linked Group 2:  \"And\" Linked Group Details        cephalexin (KEFLEX) capsule 1,000 mg (Discontinued) 1,000 mg Every 8 Hours Scheduled 5/2/2019 5/2/2019    Sig - Route: Take 4 capsules by mouth Every 8 (Eight) Hours. - Oral    Notes to Pharmacy: Pt. Claims had keflex before with no allergy issues.  Haroon He, [facility]  5/2/2019            Lab Results (last 24 hours)     Procedure Component Value Units Date/Time    Urine Culture - Urine, Urine, Clean Catch [[id number]]  (Normal) Collected:  05/02/19 1311    Specimen:  Urine, Clean Catch Updated:  05/03/19 0740     Urine Culture No growth    Urinalysis With Culture If Indicated - Urine, Clean Catch [[id number]]  (Normal) Collected:  05/02/19 1311    Specimen:  Urine, Clean Catch Updated:  05/02/19 1332     Color, UA Yellow     Appearance, UA Clear     pH, UA 8.0     Specific Gravity, UA 1.009     Glucose, UA Negative     Ketones, UA Negative     Bilirubin, UA Negative     Blood, UA Negative     Protein, UA Negative     Leuk Esterase, UA Negative     Nitrite, UA Negative     Urobilinogen, UA 0.2 E.U./dL    Narrative:       Urine microscopic not indicated.    Preeclampsia Panel [[id number]]  (Abnormal) Collected:  05/02/19 1040    Specimen:  Blood Updated:  05/02/19 1213     Alkaline Phosphatase 60 U/L      ALT (SGPT) 8 U/L      AST (SGOT) 12 U/L      Creatinine 0.46 mg/dL      Total Bilirubin <0.2 mg/dL       U/L      Uric Acid 4.0 mg/dL     CBC (No Diff) " [[id number]]  (Normal) Collected:  05/02/19 1040    Specimen:  Blood Updated:  05/02/19 1101     WBC 7.02 10*3/mm3      RBC 4.18 10*6/mm3      Hemoglobin 12.4 g/dL      Hematocrit 37.1 %      MCV 88.8 fL      MCH 29.7 pg      MCHC 33.4 g/dL      RDW 13.6 %      RDW-SD 44.1 fl      MPV 10.2 fL      Platelets 259 10*3/mm3         Imaging Results (last 24 hours)     ** No results found for the last 24 hours. **        Physician Progress Notes (last 24 hours) (Notes from 5/2/2019  9:15 AM through 5/3/2019  9:15 AM)     No notes of this type exist for this encounter.        Consult Notes (last 24 hours) (Notes from 5/2/2019  9:15 AM through 5/3/2019  9:15 AM)     No notes of this type exist for this encounter.          Maternal Vitals (last day)     Date/Time   Temp   Pulse   Resp   BP   SpO2   Weight    05/03/19 0900   --   84   --   113/66   99   --    05/03/19 0855   --   85   --   110/58   99   --    05/03/19 0850   --   87   --   105/64   99   --    05/03/19 0845   --   79   --   99/57   99   --    05/03/19 0840   --   84   --   90/58   97   --    05/03/19 0835   --   74   --   88/46  (Abnormal)    97   --    05/03/19 0830   --   79   --   91/46   96   --    05/03/19 0826   --   73   --   --   91   --    05/03/19 0825   --   69   --   94/50   97   --    05/03/19 0824   --   71   --   --   95   --    05/03/19 0823   --   73   --   94/60   92   --    05/03/19 0822   --   79   --   94/60   97   --    05/03/19 0821   --   75   --   --   99   --    05/03/19 0820   --   73   --   --   98   --    05/03/19 0819   --   75   --   --   97   --    05/03/19 0816   97.6 (36.4)   75   18   80/54  (Abnormal)    97   --    05/03/19 0722   97.7 (36.5)   100   16   123/79   --   --    05/03/19 0701   --   --   18   --   --   --    05/03/19 0614   --   88   --   100/53   --   --    05/03/19 0607   --   --   18   --   --   --    05/03/19 0514   --   85   --   83/48  (Abnormal)    --   --    05/03/19 0509   --   --   18   --   --   --     05/03/19 0413   97.6 (36.4)   82   16   100/57   --   --    05/03/19 0323   --   84   16   103/52   --   --    05/03/19 0213   --   83   --   110/58   --   --    05/03/19 0115   --   --   18   --   --   --    05/03/19 0114   --   88   --   106/59   --   --    05/03/19 0015   --   --   18   --   --   --    05/03/19 0014   97.9 (36.6)   87   18   88/52  (Abnormal)    --   --    05/02/19 2315   --   87   18   99/58   --   --    05/02/19 2213   --   85   --   110/63   --   --    05/02/19 2113   --   93   18   103/56   --   --    05/02/19 1928   98.5 (36.9)   99   16   127/72   --   --    05/02/19 1709   98.3 (36.8)   100   16   118/76   --   --    05/02/19 1617   --   98   16   122/72   --   --    05/02/19 1537   --   94   16   102/59   --   --    05/02/19 1442   --   97   16   128/68   --   --    05/02/19 1433   --   94   --   120/61   --   --    05/02/19 1313   --   100   16   116/57   --   --    05/02/19 1224   98.3 (36.8)   --   16   --   --   --    05/02/19 1123   98.2 (36.8)   101   18   124/71   --   --    05/02/19 1118   --   96   16   121/62   --   --    05/02/19 1113   --   93   16   117/61   --   --    05/02/19 1108   --   93   16   109/56   --   --    05/02/19 1100   98.5 (36.9)   90   16   118/70   --   --    05/02/19 1006   --   --   --   --   --   101 (222)    05/02/19 1003   --   101   --   132/83   --   --    05/02/19 0957   99.1 (37.3)   103   18   142/85   --   --            FHR (last day)     Date/Time Fetal HR Assessment Method Fetal HR (beats/min) Fetal Heart Baseline Rate Fetal HR Variability Fetal HR Accelerations Fetal HR Decelerations Fetal HR Tracing Category    05/03/19 0746  intermittent auscultation, using Doppler  146  --  --  --  --  --    05/02/19 1930  intermittent auscultation, using Doppler  145  --  --  --  --  --    05/02/19 1037  external  150  normal range  minimal (detectable, amplitude less than or equal to 5 bpm)  greater than/equal to 10 bpm (32 wks gest or less);lasts at  least 10 seconds (32 wks gest or less)  absent  -- poor tracing at times    05/02/19 1030  external  150  normal range  minimal (detectable, amplitude less than or equal to 5 bpm)  greater than/equal to 10 bpm (32 wks gest or less);lasts at least 10 seconds (32 wks gest or less)  absent  -- poor tracing at times    05/02/19 1015  external  150  normal range  minimal (detectable, amplitude less than or equal to 5 bpm)  absent  absent  -- poor tracing at times        Uterine Activity (last day)     Date/Time Method Contraction Frequency (Minutes) Contraction Duration (sec) Contraction Intensity Uterine Resting Tone Contraction Pattern    05/03/19 0816  palpation  --  --  no contractions  soft by palpation  --    05/03/19 0100  external tocotransducer  --  --  no contractions  --  --    05/03/19 0000  external tocotransducer  --  --  no contractions  --  --    05/02/19 2300  external tocotransducer  --  --  no contractions  --  --    05/02/19 2200  external tocotransducer  --  --  no contractions  --  --    05/02/19 2100  external tocotransducer  --  --  no contractions  --  --    05/02/19 2000  external tocotransducer  --  --  no contractions  --  --    05/02/19 1800  external tocotransducer  --  --  --  --  Irritability pt sitting up at times    05/02/19 1700  external tocotransducer  --  --  no contractions  --  --    05/02/19 1600  external tocotransducer  --  --  no contractions  --  --    05/02/19 1500  external tocotransducer  x1 contraction  --  --  --  --    05/02/19 1430  external tocotransducer  x1 contractions  20  --  --  --    05/02/19 1400  external tocotransducer  --  --  no contractions  --  -- difficult to determine    05/02/19 1330  external tocotransducer  x3 contractions  40-80  --  --  --    05/02/19 1300  external tocotransducer  x2 contractions  --  --  --  --    05/02/19 1230  external tocotransducer  x1 contraction  60  --  --  --    05/02/19 1200  external tocotransducer  --  --  no  contractions  --  --    05/02/19 1130  external tocotransducer  x1 contaction  40  --  --  --    05/02/19 1100  external tocotransducer  --  --  no contractions  --  --    05/02/19 1037  external tocotransducer  --  --  no contractions  --  --    05/02/19 1035  --  --  --  --  soft by palpation  --    05/02/19 1030  external tocotransducer  --  --  no contractions  --  --    05/02/19 1015  external tocotransducer  --  --  no contractions  --  --    05/02/19 1007  palpation;per patient report  --  --  no contractions  soft by palpation  --    05/02/19 1000  external tocotransducer  x1 contraction  --  --  --  --        Labor Pain (last day)     Date/Time Pain Rating (0-10): Rest Pain Rating (0-10): Activity Pain Management Interventions    05/02/19 1539  --  --  no interventions per patient request;quiet environment facilitated    05/02/19 1447  --  --  no interventions per patient request

## 2019-05-03 NOTE — PLAN OF CARE
Problem: Patient Care Overview  Goal: Plan of Care Review   05/03/19 1600   Coping/Psychosocial   Plan of Care Reviewed With patient   Plan of Care Review   Progress improving

## 2019-05-03 NOTE — ANESTHESIA POSTPROCEDURE EVALUATION
Patient: Margie Marrufo    Procedure Summary     Date:  05/03/19 Room / Location:  [facility] LABOR DELIVERY 1 /  [facility]    Anesthesia Start:  0739 Anesthesia Stop:  0821    Procedure:  CERVICAL CERCLAGE (N/A Cervix) Diagnosis:      Surgeon:  Milligan, Douglas A, MD Provider:  Georgie Ha DO    Anesthesia Type:  spinal, ITN ASA Status:  2          Anesthesia Type: spinal, ITN  Last vitals  BP 94/60   Temp 97.6   Pulse 73   Resp 17   SpO2 97     Post Anesthesia Care and Evaluation    Patient location during evaluation: bedside  Patient participation: complete - patient participated  Level of consciousness: awake and alert  Pain management: adequate  Airway patency: patent  Anesthetic complications: No anesthetic complications    Cardiovascular status: acceptable  Respiratory status: acceptable  Hydration status: acceptable

## 2019-05-03 NOTE — ANESTHESIA PREPROCEDURE EVALUATION
Anesthesia Evaluation     Patient summary reviewed and Nursing notes reviewed   NPO Solid Status: > 8 hours  NPO Liquid Status: > 8 hours           Airway   Mallampati: II  TM distance: >3 FB  Neck ROM: full  Dental      Pulmonary - negative pulmonary ROS   Cardiovascular - negative cardio ROS        Neuro/Psych- negative ROS  GI/Hepatic/Renal/Endo    (+) obesity,       Musculoskeletal (-) negative ROS    Abdominal    Substance History - negative use     OB/GYN    (+) Pregnant,         Other - negative ROS                       Anesthesia Plan    ASA 2     spinal and ITN     Anesthetic plan, all risks, benefits, and alternatives have been provided, discussed and informed consent has been obtained with: patient.

## 2019-05-04 PROCEDURE — 25010000003 CEFAZOLIN 1-4 GM/50ML-% SOLUTION: Performed by: OBSTETRICS & GYNECOLOGY

## 2019-05-04 PROCEDURE — 25010000002 MAGNESIUM SULFATE-LACT RINGERS 40 GM/580ML SOLUTION

## 2019-05-04 RX ADMIN — Medication: at 00:54

## 2019-05-04 RX ADMIN — DEXTROSE AND SODIUM CHLORIDE 96 ML/HR: 5; 200 INJECTION, SOLUTION INTRAVENOUS at 04:37

## 2019-05-04 RX ADMIN — AZITHROMYCIN 500 MG: 250 TABLET, FILM COATED ORAL at 08:34

## 2019-05-04 RX ADMIN — CEFAZOLIN SODIUM 1 G: 1 INJECTION, SOLUTION INTRAVENOUS at 04:37

## 2019-05-04 RX ADMIN — ACETAMINOPHEN 1000 MG: 500 TABLET, FILM COATED ORAL at 08:32

## 2019-05-04 NOTE — PROGRESS NOTES
Labors progress note: 22 weeks 3 days gestation    S.  Denies contractions, bleeding or leakage of fluid.    O.  T = 98.1, P = 92, R = 16, BP = 109/63    Examination   Abdomen: Soft and nontender.  Gravid uterus.  Fundus nontender.   Extremities: No calf tenderness   Pelvic: Deferred    A.  Cervical incompetence status post cervical cerclage placement.    P.  Magnesium sulfate discontinued.  Continue to follow patient off of tocolytics for the next 24 hours.  Antibiotic therapy discontinued.  If no change in status, probably discharge tomorrow.

## 2019-05-04 NOTE — PLAN OF CARE
Problem: Cervical Cerclage for Cervical Insufficiency (Adult,Obstetrics,Pediatric)  Goal: Signs and Symptoms of Listed Potential Problems Will be Absent, Minimized or Managed (Cervical Cerclage for Cervical Insufficiency)   05/04/19 1142   Goal/Outcome Evaluation   Problems Assessed (Cervical Insufficiency/Cerclage) all   Problems Present (Cervical Insuff/Cerc) none

## 2019-05-04 NOTE — CONSULTS
Consult requested by OB Laborist due to questions regarding prematurity from Ms. Marrufo.     Ms. Marrufo is a 28 year old  that is now 22 2/7 weeks pregnant with a baby girl named Lynda. She is currently in the hospital due to an incompetent cervix.  This morning, she successfully had a cervical cerclage placed.  Her situation is complicated by prior pregnancy that ended in a midtrimester loss in 2018.     During our visit, Ms. Marrufo asked appropriate questions, including at what gestation we would consider resuscitation.  She was informed that we would consider resuscitation at 23 0/7 weeks gestation, as would our referral hospitals ( and [facility]).  She also inquired what gestation we would keep her baby at [facility]'s NICU.  She was informed we keep babies that are 28 0/7 weeks gestation and above and the babies born between 23 0/7-27 6/7 weeks would be transferred to 's NICU if born at [facility].  She voiced understanding about this and also concerns that if she were to deliver during this time period, she would be  from her daughter.  I recommended to Ms. Marrufo if she were to deliver prior to 28 weeks, that she should be transferred to  and deliver Lynda in that facility.  We also talked about the wide spectrum of clinical conditions/situations that can occur in a premature baby with a focus on lung development, IVH, and developmental delay.  She voiced she understood everything we talked about and asked very appropriate questions in regards to extreme prematurity. I informed Ms. Marrufo that we were happy to come talk to her again as questions arise and her clinical situation progresses.     Total time spent in consultation was 30 minutes, which includes 25 minutes of face-to-face conversation and 5 minutes of medical record review.

## 2019-05-04 NOTE — PLAN OF CARE
Problem: Patient Care Overview  Goal: Plan of Care Review   05/04/19 1143   Coping/Psychosocial   Plan of Care Reviewed With patient   Plan of Care Review   Progress improving

## 2019-05-04 NOTE — PLAN OF CARE
Problem: Patient Care Overview  Goal: Plan of Care Review   19 1605 19 013   Coping/Psychosocial   Plan of Care Reviewed With --  patient;significant other   Plan of Care Review   Progress improving --        Problem: Cervical Cerclage for Cervical Insufficiency (Adult,Obstetrics,Pediatric)  Intervention: Support Surgical and Anesthesia Recovery   19 1930 19   Safety Management   Safety Promotion/Fall Prevention --  safety round/check completed;nonskid shoes/slippers when out of bed;fall prevention program maintained   Respiratory Interventions   Airway/Ventilation Management airway patency maintained --      Intervention: Promote/Monitor Maternal/Fetal Wellbeing   19   Positioning   Body Position --  side-lying, left;turned   Reproductive Interventions   Fetal Wellbeing Promotion intake and output monitored --      Intervention: Prevent/Manage Maternal Infection   19   Safety Interventions   Infection Management aseptic technique maintained   Safety Management   Infection Prevention environmental surveillance performed;rest/sleep promoted;single patient room provided     Intervention: Implement/Monitor Measures to Manage  Labor   19   Safety Management   Medication Review/Management medications reviewed;high risk medications identified     Intervention: Prevent/Manage Postoperative Nausea and Vomiting   195   Positioning   Head of Bed (HOB) HOB lowered     Intervention: Prevent/Manage Impaired Postoperative Elimination   19   Genitourinary () Interventions   Urinary Elimination Promotion --  toileting offered   Promote Effective Bowel Elimination   Bowel Intervention adequate fluid intake promoted --      Intervention: Monitor/Manage Pain   19   Promote Oxygenation/Perfusion   Pain Management Interventions see MAR --    Safety Management   Medication Review/Management --   medications reviewed;high risk medications identified     Intervention: Prevent/Manage DVT/VTE Risk   05/04/19 0132   Interventions   VTE Prevention/Management bilateral;sequential compression devices on     Intervention: Optimize Psychosocial Response to the Surgical Experience   05/04/19 0132   Interventions   Trust Relationship/Rapport care explained;choices provided;emotional support provided;empathic listening provided;questions answered;questions encouraged;reassurance provided;thoughts/feelings acknowledged       Goal: Signs and Symptoms of Listed Potential Problems Will be Absent, Minimized or Managed (Cervical Cerclage for Cervical Insufficiency)   05/03/19 1604   Goal/Outcome Evaluation   Problems Assessed (Cervical Insufficiency/Cerclage) all   Problems Present (Cervical Insuff/Cerc) none       Problem: Skin Injury Risk (Adult)  Intervention: Maintain Head of Bed Elevation Less Than 30 Degrees as Tolerated   05/04/19 0035   Positioning   Head of Bed (HOB) HOB lowered     Intervention: Prevent/Minimize Shear/Friction Injuries   05/04/19 0132   Positioning   Positioning/Transfer Devices pillows;in use     Intervention: Prevent or Minimize Pressure   05/04/19 0202   Positioning   Body Position side-lying, left;turned       Goal: Skin Health and Integrity   05/04/19 0202   Skin Injury Risk (Adult)   Skin Health and Integrity achieves outcome

## 2019-05-05 VITALS
WEIGHT: 222 LBS | OXYGEN SATURATION: 99 % | BODY MASS INDEX: 37.9 KG/M2 | TEMPERATURE: 98.3 F | SYSTOLIC BLOOD PRESSURE: 127 MMHG | RESPIRATION RATE: 16 BRPM | HEART RATE: 90 BPM | DIASTOLIC BLOOD PRESSURE: 84 MMHG | HEIGHT: 64 IN

## 2019-05-05 PROCEDURE — 99238 HOSP IP/OBS DSCHRG MGMT 30/<: CPT | Performed by: OBSTETRICS & GYNECOLOGY

## 2019-05-05 RX ADMIN — ACETAMINOPHEN 1000 MG: 500 TABLET, FILM COATED ORAL at 08:35

## 2019-05-05 RX ADMIN — AZITHROMYCIN 500 MG: 250 TABLET, FILM COATED ORAL at 08:35

## 2019-05-05 NOTE — NURSING NOTE
Dc instructions and FU appointment with [facility] scheduled, pt to call OB for FU appointment on 5/6/19. Pt vu,NAD. Bedrest instruction reinforced.

## 2019-05-05 NOTE — DISCHARGE INSTR - ACTIVITY
You are to be on bedrest- up to eat, shower and bathroom. Also, no sexual activity and you are off work unjtil you deliver.

## 2019-05-05 NOTE — DISCHARGE SUMMARY
TYLER Stearns JOHANNE  Patient Name: Margie Marrufo  : 1990  MRN: [id number]  Date of Service: 2019  Referring Provider: Kavitha Prater     ID: [id number] y.o.  at 22w4d    Admission Diagnosis: Pregnancy [Z34.90]    Discharge Diagnosis:   Patient Active Problem List   Diagnosis   • Low back pain during pregnancy in second trimester   • Previous  delivery in second trimester, antepartum   • BMI 37.0-37.9, adult   • Morbid obesity (CMS/HCC)   • Pregnancy   • Incompetent cervix       Date of Admission: 2019  9:53 AM    Date of Discharge: 2019    Discharge Condition: Stable    Discharge to: Home    Discharge Medications:      Discharge Medications      Continue These Medications      Instructions Start Date   HYDROXYprogesterone Caproate 275 MG/1.1ML SQ Injection  Commonly known as:  TIAN   275 mg, Subcutaneous, Every 7 Days      Prenatal 27- 27-1 MG tablet tablet   Oral, Daily             Discharge Diet: Regular    Discharge Activity: Bedrest with bathroom privileges.  Strict pelvic rest.    Follow up appointments:   Your Scheduled Appointments    May 16, 2019  9:15 AM EDT  Follow Up with  [facility] DEPT SCHEDULE  [facility] (--) [address] [place] [address]  [phone]   Arrive 15 minutes prior to appointment.   May 16, 2019  9:30 AM EDT   [facility] with [address] [facility] ([facility] [address] [place] [id number] [phone]   Patient is to be hydrated          Hospital summary: Patient is a 28-year-old  3 para 0-1-1-0 female admitted at 22 weeks 1 day gestation.  She had been seen in consultation by Dr. Milligan who noted that she had an incompetent cervix with hourglassing membranes.  She was sent to labor and delivery for further evaluation.  On presentation, she was started on parenteral antibiotic therapy and received tocolysis with magnesium sulfate.  Subsequently, she underwent cervical cerclage  "placement on 5/3/2019.  Her surgery was uncomplicated.  Magnesium sulfate therapy was resumed postoperatively and this was subsequently discontinued on the morning of 5/4/2019.  Monitoring showed no increased uterine activity and nifedipine was not felt to be necessary.  On the morning of 5/5/2019, monitoring again shows no increased uterine activity.  She is felt to be stable and ready for discharge.  She will be at home bedrest and pelvic rest.  She is to return immediately for bleeding, leakage of fluid or fever/abdominal pain.    Pineda Nieves \"Zena\" ALIS Palencia MD  10:54 AM  "

## 2019-05-07 ENCOUNTER — TELEPHONE (OUTPATIENT)
Dept: WOMENS IMAGING | Facility: HOSPITAL | Age: 29
End: 2019-05-07

## 2019-05-07 NOTE — TELEPHONE ENCOUNTER
----- Message from Matt Ventura Rep sent at 5/7/2019  7:58 AM EDT -----  Contact: [phone]  Patient wanted to make an appointment for today (05/07/19) to talk to Dr. Milligan about her cerclage. She is seeing Dr. Prater at 15:45 today.  Cerclage placed 5/3/2019. Spoke with patient. She states her employer has a position that she can do and remain seated and wants to know if she can return to work in that capacity. Discussed with Dr. Milligan. Advised her he recommends she continue bedrest until next appointment here 5/16/2019, at which time her cervix/cerclage will be re-evaluated. She v/u and agrees.

## 2019-05-16 ENCOUNTER — HOSPITAL ENCOUNTER (OUTPATIENT)
Dept: WOMENS IMAGING | Facility: HOSPITAL | Age: 29
Discharge: HOME | End: 2019-05-16
Admitting: OBSTETRICS & GYNECOLOGY

## 2019-05-16 ENCOUNTER — OFFICE VISIT (OUTPATIENT)
Dept: OBSTETRICS AND GYNECOLOGY | Facility: HOSPITAL | Age: 29
End: 2019-05-16

## 2019-05-16 VITALS — DIASTOLIC BLOOD PRESSURE: 79 MMHG | BODY MASS INDEX: 37.59 KG/M2 | WEIGHT: 219 LBS | SYSTOLIC BLOOD PRESSURE: 127 MMHG

## 2019-05-16 DIAGNOSIS — O09.299 HISTORY OF PREGNANCY LOSS IN PRIOR PREGNANCY, CURRENTLY PREGNANT, UNSPECIFIED TRIMESTER: ICD-10-CM

## 2019-05-16 DIAGNOSIS — Z3A.24 24 WEEKS GESTATION OF PREGNANCY: ICD-10-CM

## 2019-05-16 DIAGNOSIS — O09.212 PREVIOUS PRETERM DELIVERY IN SECOND TRIMESTER, ANTEPARTUM: ICD-10-CM

## 2019-05-16 DIAGNOSIS — N88.3 INCOMPETENT CERVIX: ICD-10-CM

## 2019-05-16 DIAGNOSIS — N88.3 INCOMPETENT CERVIX: Primary | ICD-10-CM

## 2019-05-16 DIAGNOSIS — O34.30 CERVICAL CERCLAGE SUTURE PRESENT, UNSPECIFIED TRIMESTER: ICD-10-CM

## 2019-05-16 DIAGNOSIS — E66.01 MORBID OBESITY (HCC): ICD-10-CM

## 2019-05-16 PROCEDURE — 76815 OB US LIMITED FETUS(S): CPT | Performed by: OBSTETRICS & GYNECOLOGY

## 2019-05-16 PROCEDURE — 76815 OB US LIMITED FETUS(S): CPT

## 2019-05-16 NOTE — PROGRESS NOTES
Cerclage placed 5/3/2019. States is no longer taking Lamar injections. Reports respiratory symptoms with cough last 4 days; plans to discuss with Dr. Prater at prenatal visit today. Denies other problems.

## 2019-05-22 ENCOUNTER — HOSPITAL ENCOUNTER (OUTPATIENT)
Facility: HOSPITAL | Age: 29
Discharge: HOME | End: 2019-05-22
Attending: OBSTETRICS & GYNECOLOGY | Admitting: OBSTETRICS & GYNECOLOGY

## 2019-05-22 VITALS — TEMPERATURE: 97.9 F | HEART RATE: 115 BPM | SYSTOLIC BLOOD PRESSURE: 132 MMHG | DIASTOLIC BLOOD PRESSURE: 79 MMHG

## 2019-05-22 LAB
ALP SERPL-CCNC: 67 U/L (ref 39–117)
ALT SERPL W P-5'-P-CCNC: 10 U/L (ref 1–33)
AST SERPL-CCNC: 13 U/L (ref 1–32)
BACTERIA UR QL AUTO: NORMAL /HPF
BASOPHILS # BLD AUTO: 0.01 10*3/MM3 (ref 0–0.2)
BASOPHILS NFR BLD AUTO: 0.1 % (ref 0–1.5)
BILIRUB SERPL-MCNC: <0.2 MG/DL (ref 0.2–1.2)
BILIRUB UR QL STRIP: NEGATIVE
CLARITY UR: ABNORMAL
COLOR UR: YELLOW
CREAT BLD-MCNC: 0.49 MG/DL (ref 0.57–1)
DEPRECATED RDW RBC AUTO: 42.1 FL (ref 37–54)
EOSINOPHIL # BLD AUTO: 0.09 10*3/MM3 (ref 0–0.4)
EOSINOPHIL NFR BLD AUTO: 1.2 % (ref 0.3–6.2)
ERYTHROCYTE [DISTWIDTH] IN BLOOD BY AUTOMATED COUNT: 13.2 % (ref 12.3–15.4)
EXPIRATION DATE: NORMAL
GLUCOSE UR STRIP-MCNC: NEGATIVE MG/DL
HCT VFR BLD AUTO: 33.9 % (ref 34–46.6)
HGB BLD-MCNC: 11.5 G/DL (ref 12–15.9)
HGB UR QL STRIP.AUTO: NEGATIVE
HYALINE CASTS UR QL AUTO: NORMAL /LPF
IMM GRANULOCYTES # BLD AUTO: 0.02 10*3/MM3 (ref 0–0.05)
IMM GRANULOCYTES NFR BLD AUTO: 0.3 % (ref 0–0.5)
KETONES UR QL STRIP: NEGATIVE
LDH SERPL-CCNC: 176 U/L (ref 135–214)
LEUKOCYTE ESTERASE UR QL STRIP.AUTO: ABNORMAL
LYMPHOCYTES # BLD AUTO: 1.77 10*3/MM3 (ref 0.7–3.1)
LYMPHOCYTES NFR BLD AUTO: 23.4 % (ref 19.6–45.3)
Lab: NORMAL
MCH RBC QN AUTO: 29.6 PG (ref 26.6–33)
MCHC RBC AUTO-ENTMCNC: 33.9 G/DL (ref 31.5–35.7)
MCV RBC AUTO: 87.4 FL (ref 79–97)
MONOCYTES # BLD AUTO: 0.55 10*3/MM3 (ref 0.1–0.9)
MONOCYTES NFR BLD AUTO: 7.3 % (ref 5–12)
NEUTROPHILS # BLD AUTO: 5.13 10*3/MM3 (ref 1.7–7)
NEUTROPHILS NFR BLD AUTO: 68 % (ref 42.7–76)
NITRITE UR QL STRIP: NEGATIVE
PH UR STRIP.AUTO: 7 [PH] (ref 5–8)
PLATELET # BLD AUTO: 267 10*3/MM3 (ref 140–450)
PMV BLD AUTO: 10.2 FL (ref 6–12)
PROT UR QL STRIP: NEGATIVE
PROT UR STRIP-MCNC: NEGATIVE MG/DL
RBC # BLD AUTO: 3.88 10*6/MM3 (ref 3.77–5.28)
RBC # UR: NORMAL /HPF
REF LAB TEST METHOD: NORMAL
SP GR UR STRIP: 1.01 (ref 1–1.03)
SQUAMOUS #/AREA URNS HPF: NORMAL /HPF
URATE SERPL-MCNC: 3.8 MG/DL (ref 2.4–5.7)
UROBILINOGEN UR QL STRIP: ABNORMAL
WBC NRBC COR # BLD: 7.55 10*3/MM3 (ref 3.4–10.8)
WBC UR QL AUTO: NORMAL /HPF

## 2019-05-22 PROCEDURE — 84450 TRANSFERASE (AST) (SGOT): CPT | Performed by: OBSTETRICS & GYNECOLOGY

## 2019-05-22 PROCEDURE — 59025 FETAL NON-STRESS TEST: CPT

## 2019-05-22 PROCEDURE — 81002 URINALYSIS NONAUTO W/O SCOPE: CPT | Performed by: OBSTETRICS & GYNECOLOGY

## 2019-05-22 PROCEDURE — 84075 ASSAY ALKALINE PHOSPHATASE: CPT | Performed by: OBSTETRICS & GYNECOLOGY

## 2019-05-22 PROCEDURE — 99213 OFFICE O/P EST LOW 20 MIN: CPT | Performed by: OBSTETRICS & GYNECOLOGY

## 2019-05-22 PROCEDURE — G0463 HOSPITAL OUTPT CLINIC VISIT: HCPCS

## 2019-05-22 PROCEDURE — 83615 LACTATE (LD) (LDH) ENZYME: CPT | Performed by: OBSTETRICS & GYNECOLOGY

## 2019-05-22 PROCEDURE — 81001 URINALYSIS AUTO W/SCOPE: CPT | Performed by: OBSTETRICS & GYNECOLOGY

## 2019-05-22 PROCEDURE — 85025 COMPLETE CBC W/AUTO DIFF WBC: CPT | Performed by: OBSTETRICS & GYNECOLOGY

## 2019-05-22 PROCEDURE — 82247 BILIRUBIN TOTAL: CPT | Performed by: OBSTETRICS & GYNECOLOGY

## 2019-05-22 PROCEDURE — 84460 ALANINE AMINO (ALT) (SGPT): CPT | Performed by: OBSTETRICS & GYNECOLOGY

## 2019-05-22 PROCEDURE — 84550 ASSAY OF BLOOD/URIC ACID: CPT | Performed by: OBSTETRICS & GYNECOLOGY

## 2019-05-22 PROCEDURE — 82565 ASSAY OF CREATININE: CPT | Performed by: OBSTETRICS & GYNECOLOGY

## 2019-05-22 PROCEDURE — 59025 FETAL NON-STRESS TEST: CPT | Performed by: OBSTETRICS & GYNECOLOGY

## 2019-05-22 NOTE — H&P
[facility]  Obstetric History and Physical    Chief Complaint   Patient presents with   • Hypertension       Subjective     Patient is a 28 y.o. female  currently at 25w0d, who presents following a visit at a local urgent treatment center.  She was seen there secondary to otitis.  During that visit, she was noted to have 2 elevated blood pressures.  Patient denies headaches, scotomata or epigastric pain.  The provider at the urgent treatment center recommended that she present to labor and delivery for evaluation of her blood pressure.    Her prenatal care is notable for cervical incompetence.  She is status post Kaminski cervical cerclage placement. Her  previous obstetric/gynecological history is noncontributory.    The following portions of the patients history were reviewed and updated as appropriate: current medications, allergies, past medical history, past surgical history, past family history, past social history and problem list .        Prenatal Information:   Maternal Prenatal Labs  Blood Type No results found for: ABO   Rh Status No results found for: RH   Antibody Screen No results found for: ABSCRN   Gonnorhea No results found for: GCCX   Chlamydia No results found for: CLAMYDCU   RPR No results found for: RPR   Syphilis Antibody No results found for: SYPHILIS   Rubella No results found for: RUBELLAIGGIN   Hepatitis B Surface Antigen No results found for: HEPBSAG   HIV-1 Antibody No results found for: LABHIV1   Hepatitis C Antibody No results found for: HEPCAB   Rapid Urin Drug Screen No results found for: AMPMETHU, BARBITSCNUR, LABBENZSCN, LABMETHSCN, LABOPIASCN, THCURSCR, COCAINEUR, AMPHETSCREEN, PROPOXSCN, BUPRENORSCNU, METAMPSCNUR, OXYCODONESCN, TRICYCLICSCN   Group B Strep Culture No results found for: GBSANTIGEN           External Prenatal Results     Pregnancy Outside Results - Transcribed From Office Records - See Scanned Records For Details     Test Value Date Time    Hgb 12.4 g/dL  19 1040    Hct 37.1 % 19 1040    ABO A  19 1040    Rh Positive  19 1040    Antibody Screen Negative  19 1040    Glucose Fasting GTT       Glucose Tolerance Test 1 hour       Glucose Tolerance Test 3 hour       Gonorrhea (discrete) Negative  18     Chlamydia (discrete) Negative  18     RPR       VDRL       Syphilis Antibody       Rubella       HBsAg Negative  18     Herpes Simplex Virus PCR       Herpes Simplex VIrus Culture       HIV       Hep C RNA Quant PCR       Hep C Antibody       AFP       Group B Strep       GBS Susceptibility to Clindamycin       GBS Susceptibility to Erythromycin       Fetal Fibronectin       Genetic Testing, Maternal Blood             Drug Screening     Test Value Date Time    Urine Drug Screen Negative  18     Amphetamine Screen Negative  18 1647    Barbiturate Screen Negative  18 1647    Benzodiazepine Screen Negative  18 1647    Methadone Screen Negative  18 1647    Phencyclidine Screen Negative  18 1647    Opiates Screen Negative  18 1647    THC Screen Negative  18 1647    Cocaine Screen       Propoxyphene Screen Negative  18 1647    Buprenorphine Screen Negative  18 1647    Methamphetamine Screen       Oxycodone Screen Negative  18 1647    Tricyclic Antidepressants Screen Negative  18 1647                  Past OB History:       Obstetric History       T0      L0     SAB1   TAB0   Ectopic0   Molar0   Multiple0   Live Births1       # Outcome Date GA Lbr Gary/2nd Weight Sex Delivery Anes PTL Lv   3 Current            2  18 21w5d 18:25 / 02:44 400 g (14.1 oz) F Vag-Spont None Y ND      Name: LINHSEBASREGINA      Apgar1:  2   1 SAB 17 8w0d    SAB         Obstetric Comments   FOB #1 (): Pregnancy #1-3       Past Medical History:  Past Medical History:   Diagnosis Date   • PCOS (polycystic ovarian syndrome)    • Urinary tract  infection    • Urogenital trichomoniasis     treated many years ago      Past Surgical History Past Surgical History:   Procedure Laterality Date   • CERVICAL CERCLAGE N/A 5/3/2019    Procedure: CERVICAL CERCLAGE;  Surgeon: Milligan, Douglas A, MD;  Location: [facility] LABOR DELIVERY;  Service: Obstetrics/Gynecology   • CHOLECYSTECTOMY     • EAR TUBES     • TONSILLECTOMY AND ADENOIDECTOMY        Family History: Family History   Problem Relation Age of Onset   • Coronary artery disease Father    • Hypertension Father    • Coronary artery disease Mother    • Diabetes Mother    • Hypertension Mother       Social History:  reports that she quit smoking about 2 years ago. Her smoking use included cigarettes. She has never used smokeless tobacco.   reports that she does not drink alcohol.   reports that she does not use drugs.   Allergies: Penicillins  Current Medications:          No current facility-administered medications on file prior to encounter.      Current Outpatient Medications on File Prior to Encounter   Medication Sig Dispense Refill   • Prenatal Vit-Fe Fumarate-FA (PRENATAL 27-1) 27-1 MG tablet tablet Take  by mouth Daily.         General ROS: Pertinent items are noted in HPI, all other systems reviewed and negative    Objective       Vital Signs Range for the last 24 hours  Temperature: Temp:  [97.9 °F (36.6 °C)] 97.9 °F (36.6 °C)   Temp Source: Temp src: Oral   BP: BP: (121)/(77) 121/77   Pulse: Heart Rate:  [115] 115   Respirations:     SPO2:     O2 Amount (l/min):     O2 Devices     Weight:       Physical Examination: General appearance - alert, well appearing, and in no distress, oriented to person, place, and time and overweight  Mental status - alert, oriented to person, place, and time, normal mood, behavior, speech, dress, motor activity, and thought processes  Eyes - pupils equal and reactive, extraocular eye movements intact, sclera anicteric  Mouth - mucous membranes moist, pharynx normal without  lesions and dental hygiene good  Neck - supple, no significant adenopathy, thyroid exam: thyroid is normal in size without nodules or tenderness  Chest - clear to auscultation, no wheezes, rales or rhonchi, symmetric air entry  Heart - normal rate, regular rhythm, normal S1, S2, no murmurs, rubs, clicks or gallops  Abdomen-soft, nontender, nondistended, no masses or organomegaly   Abdomen, Non-Tender  Neurological - alert, oriented, normal speech, no focal findings or movement disorder noted, DTR's normal and symmetric  Extremities - no pedal edema noted    Fetal Heart Rate Assessment  Indication: Transient gestational hypertension   Start Time: 180                end Time: 184   NST Results: Reactive NST.  Baseline fetal heart rate 145-155 bpm.  Average variability.  10 x 10 accelerations noted.  No decelerations.  No contractions.        Laboratory Results:   Lab Results   Component Value Date    ALKPHOS 60 2019    ALT 8 2019    AST 12 2019    CREATININE 0.46 (L) 2019    BILITOT <0.2 (L) 2019     2019    URICACID 4.0 2019       No results found for: WBC, RBC, HGB, HCT, MCV, MCH, MCHC, RDW, RDWSD, MPV, PLT, NEUTRORELPCT, LYMPHORELPCT, MONORELPCT, EOSRELPCT, BASORELPCT, AUTOIGPER, NEUTROABS, LYMPHSABS, MONOSABS, EOSABS, BASOSABS, AUTOIGNUM, NRBC          Brief Urine Lab Results  (Last result in the past 365 days)      Color   Clarity   Blood   Leuk Est   Nitrite   Protein   CREAT   Urine HCG        19 1816           Negative                 Radiology Review: No new studies  Other Studies: POC urine negative for protein.  CBC and PEP ordered.      Assessment/Plan       * No active hospital problems. *        Assessment:  1. Episode of transient hypertension on evaluation at urgent treatment.  Normotensive on evaluation on labor and delivery.  2. Cervical incompetence.  Status post Kaminski cervical cerclage placement.    Plan:  1. Discharge to  "home.  2. Keep regularly scheduled office OB visit.     Total time spent today with Margie  was 20 minutes (level 3).  Of this time, > 50% was spent face-to-face time coordinating care, answering her questions and counseling regarding pathophysiology of her presenting problem along with plans for any diagnostic work-up and treatment.        Pineda Nieves \"Excelsior\" ALIS Palencia MD  5/22/2019  6:38 PM    "

## 2019-06-20 LAB — EXTERNAL GTT 1 HOUR: 200

## 2019-06-25 ENCOUNTER — TRANSCRIBE ORDERS (OUTPATIENT)
Dept: DIABETES SERVICES | Facility: HOSPITAL | Age: 29
End: 2019-06-25

## 2019-06-25 DIAGNOSIS — O24.410 DIET CONTROLLED GESTATIONAL DIABETES MELLITUS (GDM), ANTEPARTUM: Primary | ICD-10-CM

## 2019-06-27 ENCOUNTER — APPOINTMENT (OUTPATIENT)
Dept: DIABETES SERVICES | Facility: HOSPITAL | Age: 29
End: 2019-06-27

## 2019-06-27 ENCOUNTER — HOSPITAL ENCOUNTER (EMERGENCY)
Facility: HOSPITAL | Age: 29
Discharge: HOME | End: 2019-06-27
Attending: EMERGENCY MEDICINE | Admitting: EMERGENCY MEDICINE

## 2019-06-27 VITALS
WEIGHT: 222 LBS | RESPIRATION RATE: 18 BRPM | HEIGHT: 64 IN | OXYGEN SATURATION: 100 % | TEMPERATURE: 98.7 F | HEART RATE: 111 BPM | BODY MASS INDEX: 37.9 KG/M2 | DIASTOLIC BLOOD PRESSURE: 86 MMHG | SYSTOLIC BLOOD PRESSURE: 125 MMHG

## 2019-06-27 DIAGNOSIS — Z3A.30 30 WEEKS GESTATION OF PREGNANCY: ICD-10-CM

## 2019-06-27 DIAGNOSIS — S29.011A MUSCLE STRAIN OF CHEST WALL, INITIAL ENCOUNTER: Primary | ICD-10-CM

## 2019-06-27 PROCEDURE — 99284 EMERGENCY DEPT VISIT MOD MDM: CPT

## 2019-07-01 ENCOUNTER — HOSPITAL ENCOUNTER (OUTPATIENT)
Dept: DIABETES SERVICES | Facility: HOSPITAL | Age: 29
Setting detail: RECURRING SERIES
Discharge: HOME | End: 2019-07-01

## 2019-07-01 PROCEDURE — G0109 DIAB MANAGE TRN IND/GROUP: HCPCS | Performed by: REGISTERED NURSE

## 2019-07-15 ENCOUNTER — LAB REQUISITION (OUTPATIENT)
Dept: LAB | Facility: HOSPITAL | Age: 29
End: 2019-07-15

## 2019-07-15 DIAGNOSIS — Z34.82 ENCOUNTER FOR SUPERVISION OF OTHER NORMAL PREGNANCY, SECOND TRIMESTER: ICD-10-CM

## 2019-07-15 PROCEDURE — 87081 CULTURE SCREEN ONLY: CPT | Performed by: NURSE PRACTITIONER

## 2019-07-17 ENCOUNTER — HOSPITAL ENCOUNTER (INPATIENT)
Facility: HOSPITAL | Age: 29
LOS: 10 days | Discharge: HOME | End: 2019-07-28
Attending: OBSTETRICS & GYNECOLOGY | Admitting: OBSTETRICS & GYNECOLOGY

## 2019-07-17 LAB
ABO GROUP BLD: NORMAL
BLD GP AB SCN SERPL QL: NEGATIVE
DEPRECATED RDW RBC AUTO: 39.8 FL (ref 37–54)
ERYTHROCYTE [DISTWIDTH] IN BLOOD BY AUTOMATED COUNT: 12.7 % (ref 12.3–15.4)
HCT VFR BLD AUTO: 35.5 % (ref 34–46.6)
HGB BLD-MCNC: 11.4 G/DL (ref 12–15.9)
MCH RBC QN AUTO: 27.7 PG (ref 26.6–33)
MCHC RBC AUTO-ENTMCNC: 32.1 G/DL (ref 31.5–35.7)
MCV RBC AUTO: 86.4 FL (ref 79–97)
PLATELET # BLD AUTO: 239 10*3/MM3 (ref 140–450)
PMV BLD AUTO: 11.2 FL (ref 6–12)
RBC # BLD AUTO: 4.11 10*6/MM3 (ref 3.77–5.28)
RH BLD: POSITIVE
T&S EXPIRATION DATE: NORMAL
WBC NRBC COR # BLD: 7.35 10*3/MM3 (ref 3.4–10.8)

## 2019-07-17 PROCEDURE — 59025 FETAL NON-STRESS TEST: CPT

## 2019-07-17 PROCEDURE — 86900 BLOOD TYPING SEROLOGIC ABO: CPT | Performed by: OBSTETRICS & GYNECOLOGY

## 2019-07-17 PROCEDURE — 85027 COMPLETE CBC AUTOMATED: CPT | Performed by: OBSTETRICS & GYNECOLOGY

## 2019-07-17 PROCEDURE — 86850 RBC ANTIBODY SCREEN: CPT | Performed by: OBSTETRICS & GYNECOLOGY

## 2019-07-17 PROCEDURE — 25010000003 CEFAZOLIN 1-4 GM/50ML-% SOLUTION: Performed by: OBSTETRICS & GYNECOLOGY

## 2019-07-17 PROCEDURE — 36415 COLL VENOUS BLD VENIPUNCTURE: CPT | Performed by: OBSTETRICS & GYNECOLOGY

## 2019-07-17 PROCEDURE — 25010000002 AZITHROMYCIN PER 500 MG: Performed by: NURSE PRACTITIONER

## 2019-07-17 PROCEDURE — 84112 EVAL AMNIOTIC FLUID PROTEIN: CPT | Performed by: NURSE PRACTITIONER

## 2019-07-17 PROCEDURE — 86901 BLOOD TYPING SEROLOGIC RH(D): CPT | Performed by: OBSTETRICS & GYNECOLOGY

## 2019-07-17 PROCEDURE — 25010000002 BETAMETHASONE ACET & SOD PHOS PER 4 MG: Performed by: NURSE PRACTITIONER

## 2019-07-17 RX ORDER — MAGNESIUM SULFATE HEPTAHYDRATE 40 MG/ML
2 INJECTION, SOLUTION INTRAVENOUS CONTINUOUS
Status: DISPENSED | OUTPATIENT
Start: 2019-07-17 | End: 2019-07-21

## 2019-07-17 RX ORDER — SODIUM CHLORIDE, SODIUM LACTATE, POTASSIUM CHLORIDE, CALCIUM CHLORIDE 600; 310; 30; 20 MG/100ML; MG/100ML; MG/100ML; MG/100ML
125 INJECTION, SOLUTION INTRAVENOUS CONTINUOUS
Status: DISCONTINUED | OUTPATIENT
Start: 2019-07-17 | End: 2019-07-23

## 2019-07-17 RX ORDER — CEFAZOLIN SODIUM 1 G/50ML
1 INJECTION, SOLUTION INTRAVENOUS EVERY 8 HOURS
Status: COMPLETED | OUTPATIENT
Start: 2019-07-17 | End: 2019-07-21

## 2019-07-17 RX ORDER — BETAMETHASONE SODIUM PHOSPHATE AND BETAMETHASONE ACETATE 3; 3 MG/ML; MG/ML
12 INJECTION, SUSPENSION INTRA-ARTICULAR; INTRALESIONAL; INTRAMUSCULAR; SOFT TISSUE EVERY 24 HOURS
Status: COMPLETED | OUTPATIENT
Start: 2019-07-17 | End: 2019-07-18

## 2019-07-17 RX ADMIN — CEFAZOLIN SODIUM 1 G: 1 INJECTION, SOLUTION INTRAVENOUS at 20:03

## 2019-07-17 RX ADMIN — AZITHROMYCIN MONOHYDRATE 500 MG: 500 INJECTION, POWDER, LYOPHILIZED, FOR SOLUTION INTRAVENOUS at 18:42

## 2019-07-17 RX ADMIN — SODIUM CHLORIDE, POTASSIUM CHLORIDE, SODIUM LACTATE AND CALCIUM CHLORIDE 125 ML/HR: 600; 310; 30; 20 INJECTION, SOLUTION INTRAVENOUS at 18:42

## 2019-07-17 RX ADMIN — BETAMETHASONE SODIUM PHOSPHATE AND BETAMETHASONE ACETATE 12 MG: 3; 3 INJECTION, SUSPENSION INTRA-ARTICULAR; INTRALESIONAL; INTRAMUSCULAR at 18:38

## 2019-07-18 PROBLEM — O60.00 PRETERM LABOR: Status: ACTIVE | Noted: 2019-07-18

## 2019-07-18 LAB
BACTERIA SPEC AEROBE CULT: ABNORMAL
GLUCOSE BLDC GLUCOMTR-MCNC: 176 MG/DL (ref 70–130)
POC AMNISURE: NEGATIVE
STREP GROUPING: ABNORMAL

## 2019-07-18 PROCEDURE — 25010000003 CEFAZOLIN 1-4 GM/50ML-% SOLUTION: Performed by: OBSTETRICS & GYNECOLOGY

## 2019-07-18 PROCEDURE — 59025 FETAL NON-STRESS TEST: CPT

## 2019-07-18 PROCEDURE — 25010000002 AZITHROMYCIN PER 500 MG: Performed by: NURSE PRACTITIONER

## 2019-07-18 PROCEDURE — 82962 GLUCOSE BLOOD TEST: CPT

## 2019-07-18 PROCEDURE — 25010000002 BETAMETHASONE ACET & SOD PHOS PER 4 MG: Performed by: NURSE PRACTITIONER

## 2019-07-18 RX ORDER — ACETAMINOPHEN 500 MG
1000 TABLET ORAL EVERY 6 HOURS PRN
Status: DISCONTINUED | OUTPATIENT
Start: 2019-07-18 | End: 2019-07-25

## 2019-07-18 RX ADMIN — SODIUM CHLORIDE, POTASSIUM CHLORIDE, SODIUM LACTATE AND CALCIUM CHLORIDE 125 ML/HR: 600; 310; 30; 20 INJECTION, SOLUTION INTRAVENOUS at 07:58

## 2019-07-18 RX ADMIN — CEFAZOLIN SODIUM 1 G: 1 INJECTION, SOLUTION INTRAVENOUS at 21:37

## 2019-07-18 RX ADMIN — BETAMETHASONE SODIUM PHOSPHATE AND BETAMETHASONE ACETATE 12 MG: 3; 3 INJECTION, SUSPENSION INTRA-ARTICULAR; INTRALESIONAL; INTRAMUSCULAR at 18:39

## 2019-07-18 RX ADMIN — CEFAZOLIN SODIUM 1 G: 1 INJECTION, SOLUTION INTRAVENOUS at 13:14

## 2019-07-18 RX ADMIN — ACETAMINOPHEN 1000 MG: 500 TABLET, FILM COATED ORAL at 04:31

## 2019-07-18 RX ADMIN — CEFAZOLIN SODIUM 1 G: 1 INJECTION, SOLUTION INTRAVENOUS at 04:31

## 2019-07-18 RX ADMIN — SODIUM CHLORIDE, POTASSIUM CHLORIDE, SODIUM LACTATE AND CALCIUM CHLORIDE 75 ML/HR: 600; 310; 30; 20 INJECTION, SOLUTION INTRAVENOUS at 23:56

## 2019-07-18 RX ADMIN — AZITHROMYCIN MONOHYDRATE 500 MG: 500 INJECTION, POWDER, LYOPHILIZED, FOR SOLUTION INTRAVENOUS at 17:13

## 2019-07-18 RX ADMIN — MAGNESIUM SULFATE HEPTAHYDRATE 2 G/HR: 40 INJECTION, SOLUTION INTRAVENOUS at 17:08

## 2019-07-18 RX ADMIN — MAGNESIUM SULFATE HEPTAHYDRATE 3 G/HR: 40 INJECTION, SOLUTION INTRAVENOUS at 07:58

## 2019-07-18 RX ADMIN — ACETAMINOPHEN 1000 MG: 500 TABLET, FILM COATED ORAL at 16:25

## 2019-07-19 ENCOUNTER — APPOINTMENT (OUTPATIENT)
Dept: WOMENS IMAGING | Facility: HOSPITAL | Age: 29
End: 2019-07-19

## 2019-07-19 PROCEDURE — 25010000003 CEFAZOLIN 1-4 GM/50ML-% SOLUTION: Performed by: OBSTETRICS & GYNECOLOGY

## 2019-07-19 PROCEDURE — 59020 FETAL CONTRACT STRESS TEST: CPT

## 2019-07-19 PROCEDURE — 76816 OB US FOLLOW-UP PER FETUS: CPT

## 2019-07-19 PROCEDURE — 76816 OB US FOLLOW-UP PER FETUS: CPT | Performed by: OBSTETRICS & GYNECOLOGY

## 2019-07-19 PROCEDURE — 25010000002 AZITHROMYCIN: Performed by: NURSE PRACTITIONER

## 2019-07-19 RX ADMIN — ACETAMINOPHEN 1000 MG: 500 TABLET, FILM COATED ORAL at 06:28

## 2019-07-19 RX ADMIN — CEFAZOLIN SODIUM 1 G: 1 INJECTION, SOLUTION INTRAVENOUS at 20:02

## 2019-07-19 RX ADMIN — CEFAZOLIN SODIUM 1 G: 1 INJECTION, SOLUTION INTRAVENOUS at 04:07

## 2019-07-19 RX ADMIN — AZITHROMYCIN MONOHYDRATE 500 MG: 500 INJECTION, POWDER, LYOPHILIZED, FOR SOLUTION INTRAVENOUS at 18:22

## 2019-07-19 RX ADMIN — MAGNESIUM SULFATE HEPTAHYDRATE 2 G/HR: 40 INJECTION, SOLUTION INTRAVENOUS at 16:06

## 2019-07-19 RX ADMIN — CEFAZOLIN SODIUM 1 G: 1 INJECTION, SOLUTION INTRAVENOUS at 12:18

## 2019-07-19 RX ADMIN — MAGNESIUM SULFATE HEPTAHYDRATE 2 G/HR: 40 INJECTION, SOLUTION INTRAVENOUS at 05:59

## 2019-07-20 PROCEDURE — 25010000002 AZITHROMYCIN PER 500 MG: Performed by: NURSE PRACTITIONER

## 2019-07-20 PROCEDURE — 59025 FETAL NON-STRESS TEST: CPT

## 2019-07-20 PROCEDURE — 25010000003 CEFAZOLIN 1-4 GM/50ML-% SOLUTION: Performed by: OBSTETRICS & GYNECOLOGY

## 2019-07-20 RX ORDER — DOCUSATE SODIUM 100 MG/1
100 CAPSULE, LIQUID FILLED ORAL 2 TIMES DAILY
Status: DISCONTINUED | OUTPATIENT
Start: 2019-07-20 | End: 2019-07-28 | Stop reason: HOSPADM

## 2019-07-20 RX ORDER — FAMOTIDINE 10 MG/ML
20 INJECTION, SOLUTION INTRAVENOUS 2 TIMES DAILY
Status: DISCONTINUED | OUTPATIENT
Start: 2019-07-20 | End: 2019-07-22

## 2019-07-20 RX ADMIN — DOCUSATE SODIUM 100 MG: 100 CAPSULE, LIQUID FILLED ORAL at 20:07

## 2019-07-20 RX ADMIN — CEFAZOLIN SODIUM 1 G: 1 INJECTION, SOLUTION INTRAVENOUS at 04:02

## 2019-07-20 RX ADMIN — MAGNESIUM SULFATE HEPTAHYDRATE 2 G/HR: 40 INJECTION, SOLUTION INTRAVENOUS at 03:04

## 2019-07-20 RX ADMIN — FAMOTIDINE 20 MG: 10 INJECTION, SOLUTION INTRAVENOUS at 13:45

## 2019-07-20 RX ADMIN — CEFAZOLIN SODIUM 1 G: 1 INJECTION, SOLUTION INTRAVENOUS at 12:44

## 2019-07-20 RX ADMIN — AZITHROMYCIN MONOHYDRATE 500 MG: 500 INJECTION, POWDER, LYOPHILIZED, FOR SOLUTION INTRAVENOUS at 17:46

## 2019-07-20 RX ADMIN — CEFAZOLIN SODIUM 1 G: 1 INJECTION, SOLUTION INTRAVENOUS at 20:07

## 2019-07-20 RX ADMIN — FAMOTIDINE 20 MG: 10 INJECTION, SOLUTION INTRAVENOUS at 20:07

## 2019-07-20 RX ADMIN — SODIUM CHLORIDE, POTASSIUM CHLORIDE, SODIUM LACTATE AND CALCIUM CHLORIDE 1500 ML/HR: 600; 310; 30; 20 INJECTION, SOLUTION INTRAVENOUS at 14:37

## 2019-07-21 LAB
ABO GROUP BLD: NORMAL
BLD GP AB SCN SERPL QL: NEGATIVE
RH BLD: POSITIVE
T&S EXPIRATION DATE: NORMAL

## 2019-07-21 PROCEDURE — 86900 BLOOD TYPING SEROLOGIC ABO: CPT | Performed by: OBSTETRICS & GYNECOLOGY

## 2019-07-21 PROCEDURE — 59025 FETAL NON-STRESS TEST: CPT

## 2019-07-21 PROCEDURE — 25010000003 CEFAZOLIN 1-4 GM/50ML-% SOLUTION: Performed by: OBSTETRICS & GYNECOLOGY

## 2019-07-21 PROCEDURE — 86850 RBC ANTIBODY SCREEN: CPT | Performed by: OBSTETRICS & GYNECOLOGY

## 2019-07-21 PROCEDURE — 25010000002 AZITHROMYCIN: Performed by: NURSE PRACTITIONER

## 2019-07-21 PROCEDURE — 86901 BLOOD TYPING SEROLOGIC RH(D): CPT | Performed by: OBSTETRICS & GYNECOLOGY

## 2019-07-21 RX ADMIN — CEFAZOLIN SODIUM 1 G: 1 INJECTION, SOLUTION INTRAVENOUS at 04:08

## 2019-07-21 RX ADMIN — DOCUSATE SODIUM 100 MG: 100 CAPSULE, LIQUID FILLED ORAL at 09:11

## 2019-07-21 RX ADMIN — DOCUSATE SODIUM 100 MG: 100 CAPSULE, LIQUID FILLED ORAL at 20:22

## 2019-07-21 RX ADMIN — SODIUM CHLORIDE, POTASSIUM CHLORIDE, SODIUM LACTATE AND CALCIUM CHLORIDE 999 ML/HR: 600; 310; 30; 20 INJECTION, SOLUTION INTRAVENOUS at 09:21

## 2019-07-21 RX ADMIN — SODIUM CHLORIDE, POTASSIUM CHLORIDE, SODIUM LACTATE AND CALCIUM CHLORIDE 125 ML/HR: 600; 310; 30; 20 INJECTION, SOLUTION INTRAVENOUS at 20:09

## 2019-07-21 RX ADMIN — AZITHROMYCIN MONOHYDRATE 500 MG: 500 INJECTION, POWDER, LYOPHILIZED, FOR SOLUTION INTRAVENOUS at 20:09

## 2019-07-21 RX ADMIN — FAMOTIDINE 20 MG: 10 INJECTION, SOLUTION INTRAVENOUS at 20:23

## 2019-07-22 ENCOUNTER — APPOINTMENT (OUTPATIENT)
Dept: WOMENS IMAGING | Facility: HOSPITAL | Age: 29
End: 2019-07-22

## 2019-07-22 PROCEDURE — 76819 FETAL BIOPHYS PROFIL W/O NST: CPT

## 2019-07-22 PROCEDURE — 59025 FETAL NON-STRESS TEST: CPT

## 2019-07-22 PROCEDURE — 76819 FETAL BIOPHYS PROFIL W/O NST: CPT | Performed by: OBSTETRICS & GYNECOLOGY

## 2019-07-22 RX ORDER — FAMOTIDINE 20 MG/1
20 TABLET, FILM COATED ORAL
Status: DISCONTINUED | OUTPATIENT
Start: 2019-07-22 | End: 2019-07-24

## 2019-07-22 RX ORDER — AZITHROMYCIN 250 MG/1
500 TABLET, FILM COATED ORAL ONCE
Status: COMPLETED | OUTPATIENT
Start: 2019-07-22 | End: 2019-07-22

## 2019-07-22 RX ADMIN — FAMOTIDINE 20 MG: 20 TABLET ORAL at 18:25

## 2019-07-22 RX ADMIN — FAMOTIDINE 20 MG: 20 TABLET ORAL at 10:36

## 2019-07-22 RX ADMIN — DOCUSATE SODIUM 100 MG: 100 CAPSULE, LIQUID FILLED ORAL at 20:54

## 2019-07-22 RX ADMIN — AZITHROMYCIN 500 MG: 250 TABLET, FILM COATED ORAL at 20:52

## 2019-07-23 PROCEDURE — 59025 FETAL NON-STRESS TEST: CPT

## 2019-07-23 RX ORDER — AZITHROMYCIN 250 MG/1
250 TABLET, FILM COATED ORAL
Status: DISPENSED | OUTPATIENT
Start: 2019-07-23 | End: 2019-07-26

## 2019-07-23 RX ORDER — AZITHROMYCIN 250 MG/1
250 TABLET, FILM COATED ORAL
Status: DISCONTINUED | OUTPATIENT
Start: 2019-07-23 | End: 2019-07-23

## 2019-07-23 RX ADMIN — AZITHROMYCIN 250 MG: 250 TABLET, FILM COATED ORAL at 21:10

## 2019-07-23 RX ADMIN — DOCUSATE SODIUM 100 MG: 100 CAPSULE, LIQUID FILLED ORAL at 07:56

## 2019-07-23 RX ADMIN — DOCUSATE SODIUM 100 MG: 100 CAPSULE, LIQUID FILLED ORAL at 21:10

## 2019-07-23 RX ADMIN — FAMOTIDINE 20 MG: 20 TABLET ORAL at 07:55

## 2019-07-23 RX ADMIN — FAMOTIDINE 20 MG: 20 TABLET ORAL at 16:31

## 2019-07-24 LAB
ABO GROUP BLD: NORMAL
ALP SERPL-CCNC: 108 U/L (ref 39–117)
ALT SERPL W P-5'-P-CCNC: 8 U/L (ref 1–33)
AST SERPL-CCNC: 15 U/L (ref 1–32)
BILIRUB SERPL-MCNC: <0.2 MG/DL (ref 0.2–1.2)
BLD GP AB SCN SERPL QL: NEGATIVE
CREAT BLD-MCNC: 0.49 MG/DL (ref 0.57–1)
DEPRECATED RDW RBC AUTO: 40.2 FL (ref 37–54)
ERYTHROCYTE [DISTWIDTH] IN BLOOD BY AUTOMATED COUNT: 12.7 % (ref 12.3–15.4)
HCT VFR BLD AUTO: 31.5 % (ref 34–46.6)
HGB BLD-MCNC: 10.1 G/DL (ref 12–15.9)
LDH SERPL-CCNC: 186 U/L (ref 135–214)
MCH RBC QN AUTO: 27.7 PG (ref 26.6–33)
MCHC RBC AUTO-ENTMCNC: 32.1 G/DL (ref 31.5–35.7)
MCV RBC AUTO: 86.5 FL (ref 79–97)
PLATELET # BLD AUTO: 240 10*3/MM3 (ref 140–450)
PMV BLD AUTO: 11.1 FL (ref 6–12)
RBC # BLD AUTO: 3.64 10*6/MM3 (ref 3.77–5.28)
RH BLD: POSITIVE
T&S EXPIRATION DATE: NORMAL
URATE SERPL-MCNC: 3.1 MG/DL (ref 2.4–5.7)
WBC NRBC COR # BLD: 6.37 10*3/MM3 (ref 3.4–10.8)

## 2019-07-24 PROCEDURE — 86900 BLOOD TYPING SEROLOGIC ABO: CPT | Performed by: OBSTETRICS & GYNECOLOGY

## 2019-07-24 PROCEDURE — 84075 ASSAY ALKALINE PHOSPHATASE: CPT | Performed by: OBSTETRICS & GYNECOLOGY

## 2019-07-24 PROCEDURE — 82247 BILIRUBIN TOTAL: CPT | Performed by: OBSTETRICS & GYNECOLOGY

## 2019-07-24 PROCEDURE — 84550 ASSAY OF BLOOD/URIC ACID: CPT | Performed by: OBSTETRICS & GYNECOLOGY

## 2019-07-24 PROCEDURE — 86850 RBC ANTIBODY SCREEN: CPT | Performed by: OBSTETRICS & GYNECOLOGY

## 2019-07-24 PROCEDURE — 83615 LACTATE (LD) (LDH) ENZYME: CPT | Performed by: OBSTETRICS & GYNECOLOGY

## 2019-07-24 PROCEDURE — 84460 ALANINE AMINO (ALT) (SGPT): CPT | Performed by: OBSTETRICS & GYNECOLOGY

## 2019-07-24 PROCEDURE — 86901 BLOOD TYPING SEROLOGIC RH(D): CPT | Performed by: OBSTETRICS & GYNECOLOGY

## 2019-07-24 PROCEDURE — 84450 TRANSFERASE (AST) (SGOT): CPT | Performed by: OBSTETRICS & GYNECOLOGY

## 2019-07-24 PROCEDURE — 82565 ASSAY OF CREATININE: CPT | Performed by: OBSTETRICS & GYNECOLOGY

## 2019-07-24 PROCEDURE — 59025 FETAL NON-STRESS TEST: CPT

## 2019-07-24 PROCEDURE — 85027 COMPLETE CBC AUTOMATED: CPT | Performed by: OBSTETRICS & GYNECOLOGY

## 2019-07-24 RX ORDER — FAMOTIDINE 20 MG/1
20 TABLET, FILM COATED ORAL 2 TIMES DAILY PRN
Status: DISCONTINUED | OUTPATIENT
Start: 2019-07-24 | End: 2019-07-25

## 2019-07-24 RX ADMIN — FAMOTIDINE 20 MG: 20 TABLET ORAL at 13:21

## 2019-07-24 RX ADMIN — DOCUSATE SODIUM 100 MG: 100 CAPSULE, LIQUID FILLED ORAL at 08:13

## 2019-07-25 ENCOUNTER — APPOINTMENT (OUTPATIENT)
Dept: WOMENS IMAGING | Facility: HOSPITAL | Age: 29
End: 2019-07-25

## 2019-07-25 PROCEDURE — 59200 INSERT CERVICAL DILATOR: CPT | Performed by: OBSTETRICS & GYNECOLOGY

## 2019-07-25 PROCEDURE — 59025 FETAL NON-STRESS TEST: CPT

## 2019-07-25 PROCEDURE — 76819 FETAL BIOPHYS PROFIL W/O NST: CPT | Performed by: OBSTETRICS & GYNECOLOGY

## 2019-07-25 PROCEDURE — 76819 FETAL BIOPHYS PROFIL W/O NST: CPT

## 2019-07-25 RX ORDER — ONDANSETRON 4 MG/1
4 TABLET, FILM COATED ORAL EVERY 6 HOURS PRN
Status: DISCONTINUED | OUTPATIENT
Start: 2019-07-25 | End: 2019-07-26

## 2019-07-25 RX ORDER — CLINDAMYCIN PHOSPHATE 600 MG/50ML
600 INJECTION INTRAVENOUS EVERY 8 HOURS
Status: DISCONTINUED | OUTPATIENT
Start: 2019-07-25 | End: 2019-07-25

## 2019-07-25 RX ORDER — ONDANSETRON 2 MG/ML
4 INJECTION INTRAMUSCULAR; INTRAVENOUS EVERY 6 HOURS PRN
Status: DISCONTINUED | OUTPATIENT
Start: 2019-07-25 | End: 2019-07-26

## 2019-07-25 RX ORDER — SODIUM CHLORIDE 0.9 % (FLUSH) 0.9 %
3 SYRINGE (ML) INJECTION EVERY 12 HOURS SCHEDULED
Status: DISCONTINUED | OUTPATIENT
Start: 2019-07-25 | End: 2019-07-26 | Stop reason: HOSPADM

## 2019-07-25 RX ORDER — BUTORPHANOL TARTRATE 1 MG/ML
1 INJECTION, SOLUTION INTRAMUSCULAR; INTRAVENOUS
Status: DISCONTINUED | OUTPATIENT
Start: 2019-07-25 | End: 2019-07-26 | Stop reason: HOSPADM

## 2019-07-25 RX ORDER — SODIUM CHLORIDE, SODIUM LACTATE, POTASSIUM CHLORIDE, CALCIUM CHLORIDE 600; 310; 30; 20 MG/100ML; MG/100ML; MG/100ML; MG/100ML
125 INJECTION, SOLUTION INTRAVENOUS CONTINUOUS
Status: DISCONTINUED | OUTPATIENT
Start: 2019-07-25 | End: 2019-07-28 | Stop reason: HOSPADM

## 2019-07-25 RX ORDER — PROMETHAZINE HYDROCHLORIDE 12.5 MG/1
12.5 TABLET ORAL EVERY 6 HOURS PRN
Status: DISCONTINUED | OUTPATIENT
Start: 2019-07-25 | End: 2019-07-26 | Stop reason: HOSPADM

## 2019-07-25 RX ORDER — PROMETHAZINE HYDROCHLORIDE 25 MG/ML
12.5 INJECTION, SOLUTION INTRAMUSCULAR; INTRAVENOUS EVERY 6 HOURS PRN
Status: DISCONTINUED | OUTPATIENT
Start: 2019-07-25 | End: 2019-07-26 | Stop reason: HOSPADM

## 2019-07-25 RX ORDER — CLINDAMYCIN PHOSPHATE 900 MG/50ML
900 INJECTION INTRAVENOUS EVERY 8 HOURS
Status: DISCONTINUED | OUTPATIENT
Start: 2019-07-25 | End: 2019-07-26

## 2019-07-25 RX ORDER — LIDOCAINE HYDROCHLORIDE 10 MG/ML
5 INJECTION, SOLUTION EPIDURAL; INFILTRATION; INTRACAUDAL; PERINEURAL AS NEEDED
Status: DISCONTINUED | OUTPATIENT
Start: 2019-07-25 | End: 2019-07-26 | Stop reason: HOSPADM

## 2019-07-25 RX ORDER — MAGNESIUM CARB/ALUMINUM HYDROX 105-160MG
30 TABLET,CHEWABLE ORAL ONCE
Status: DISCONTINUED | OUTPATIENT
Start: 2019-07-25 | End: 2019-07-26 | Stop reason: HOSPADM

## 2019-07-25 RX ORDER — OXYTOCIN-SODIUM CHLORIDE 0.9% IV SOLN 30 UNIT/500ML 30-0.9/5 UT/ML-%
2-24 SOLUTION INTRAVENOUS
Status: DISCONTINUED | OUTPATIENT
Start: 2019-07-25 | End: 2019-07-26 | Stop reason: HOSPADM

## 2019-07-25 RX ORDER — PROMETHAZINE HYDROCHLORIDE 12.5 MG/1
12.5 SUPPOSITORY RECTAL EVERY 6 HOURS PRN
Status: DISCONTINUED | OUTPATIENT
Start: 2019-07-25 | End: 2019-07-26 | Stop reason: HOSPADM

## 2019-07-25 RX ORDER — SODIUM CHLORIDE 0.9 % (FLUSH) 0.9 %
1-10 SYRINGE (ML) INJECTION AS NEEDED
Status: DISCONTINUED | OUTPATIENT
Start: 2019-07-25 | End: 2019-07-26 | Stop reason: HOSPADM

## 2019-07-25 RX ADMIN — OXYTOCIN 2 MILLI-UNITS/MIN: 10 INJECTION INTRAVENOUS at 18:52

## 2019-07-25 RX ADMIN — FAMOTIDINE 20 MG: 20 TABLET ORAL at 17:30

## 2019-07-25 RX ADMIN — AZITHROMYCIN 250 MG: 250 TABLET, FILM COATED ORAL at 22:36

## 2019-07-25 RX ADMIN — SODIUM CHLORIDE, POTASSIUM CHLORIDE, SODIUM LACTATE AND CALCIUM CHLORIDE 125 ML/HR: 600; 310; 30; 20 INJECTION, SOLUTION INTRAVENOUS at 16:08

## 2019-07-25 RX ADMIN — CLINDAMYCIN PHOSPHATE 600 MG: 600 INJECTION, SOLUTION INTRAVENOUS at 16:52

## 2019-07-25 RX ADMIN — DOCUSATE SODIUM 100 MG: 100 CAPSULE, LIQUID FILLED ORAL at 22:36

## 2019-07-26 ENCOUNTER — ANESTHESIA (OUTPATIENT)
Dept: LABOR AND DELIVERY | Facility: HOSPITAL | Age: 29
End: 2019-07-26

## 2019-07-26 ENCOUNTER — ANESTHESIA EVENT (OUTPATIENT)
Dept: LABOR AND DELIVERY | Facility: HOSPITAL | Age: 29
End: 2019-07-26

## 2019-07-26 PROBLEM — O60.00 PRETERM LABOR: Status: RESOLVED | Noted: 2019-07-18 | Resolved: 2019-07-26

## 2019-07-26 PROCEDURE — 88307 TISSUE EXAM BY PATHOLOGIST: CPT | Performed by: OBSTETRICS & GYNECOLOGY

## 2019-07-26 PROCEDURE — 59025 FETAL NON-STRESS TEST: CPT

## 2019-07-26 PROCEDURE — 51703 INSERT BLADDER CATH COMPLEX: CPT

## 2019-07-26 PROCEDURE — 0UQMXZZ REPAIR VULVA, EXTERNAL APPROACH: ICD-10-PCS | Performed by: OBSTETRICS & GYNECOLOGY

## 2019-07-26 PROCEDURE — C1755 CATHETER, INTRASPINAL: HCPCS

## 2019-07-26 PROCEDURE — 25010000002 ROPIVACAINE PER 1 MG: Performed by: NURSE ANESTHETIST, CERTIFIED REGISTERED

## 2019-07-26 PROCEDURE — 25010000002 FENTANYL CITRATE (PF) 100 MCG/2ML SOLUTION: Performed by: NURSE ANESTHETIST, CERTIFIED REGISTERED

## 2019-07-26 PROCEDURE — C1755 CATHETER, INTRASPINAL: HCPCS | Performed by: ANESTHESIOLOGY

## 2019-07-26 RX ORDER — FENTANYL CITRATE 50 UG/ML
INJECTION, SOLUTION INTRAMUSCULAR; INTRAVENOUS AS NEEDED
Status: DISCONTINUED | OUTPATIENT
Start: 2019-07-26 | End: 2019-07-26 | Stop reason: SURG

## 2019-07-26 RX ORDER — MISOPROSTOL 200 UG/1
800 TABLET ORAL AS NEEDED
Status: DISCONTINUED | OUTPATIENT
Start: 2019-07-26 | End: 2019-07-26 | Stop reason: HOSPADM

## 2019-07-26 RX ORDER — BISACODYL 10 MG
10 SUPPOSITORY, RECTAL RECTAL DAILY PRN
Status: DISCONTINUED | OUTPATIENT
Start: 2019-07-27 | End: 2019-07-28 | Stop reason: HOSPADM

## 2019-07-26 RX ORDER — OXYTOCIN-SODIUM CHLORIDE 0.9% IV SOLN 30 UNIT/500ML 30-0.9/5 UT/ML-%
650 SOLUTION INTRAVENOUS ONCE
Status: COMPLETED | OUTPATIENT
Start: 2019-07-26 | End: 2019-07-26

## 2019-07-26 RX ORDER — IBUPROFEN 600 MG/1
600 TABLET ORAL EVERY 6 HOURS PRN
Status: DISCONTINUED | OUTPATIENT
Start: 2019-07-26 | End: 2019-07-28 | Stop reason: HOSPADM

## 2019-07-26 RX ORDER — ROPIVACAINE HYDROCHLORIDE 2 MG/ML
15 INJECTION, SOLUTION EPIDURAL; INFILTRATION; PERINEURAL CONTINUOUS
Status: DISCONTINUED | OUTPATIENT
Start: 2019-07-26 | End: 2019-07-28 | Stop reason: HOSPADM

## 2019-07-26 RX ORDER — FAMOTIDINE 10 MG/ML
20 INJECTION, SOLUTION INTRAVENOUS ONCE AS NEEDED
Status: COMPLETED | OUTPATIENT
Start: 2019-07-26 | End: 2019-07-26

## 2019-07-26 RX ORDER — CARBOPROST TROMETHAMINE 250 UG/ML
250 INJECTION, SOLUTION INTRAMUSCULAR AS NEEDED
Status: DISCONTINUED | OUTPATIENT
Start: 2019-07-26 | End: 2019-07-26 | Stop reason: HOSPADM

## 2019-07-26 RX ORDER — ZOLPIDEM TARTRATE 5 MG/1
5 TABLET ORAL NIGHTLY PRN
Status: DISCONTINUED | OUTPATIENT
Start: 2019-07-26 | End: 2019-07-26 | Stop reason: HOSPADM

## 2019-07-26 RX ORDER — DIPHENHYDRAMINE HYDROCHLORIDE 50 MG/ML
12.5 INJECTION INTRAMUSCULAR; INTRAVENOUS EVERY 8 HOURS PRN
Status: DISCONTINUED | OUTPATIENT
Start: 2019-07-26 | End: 2019-07-26 | Stop reason: HOSPADM

## 2019-07-26 RX ORDER — IBUPROFEN 600 MG/1
600 TABLET ORAL EVERY 6 HOURS PRN
Status: DISCONTINUED | OUTPATIENT
Start: 2019-07-26 | End: 2019-07-26 | Stop reason: HOSPADM

## 2019-07-26 RX ORDER — OXYTOCIN-SODIUM CHLORIDE 0.9% IV SOLN 30 UNIT/500ML 30-0.9/5 UT/ML-%
85 SOLUTION INTRAVENOUS ONCE
Status: COMPLETED | OUTPATIENT
Start: 2019-07-26 | End: 2019-07-26

## 2019-07-26 RX ORDER — ONDANSETRON 2 MG/ML
4 INJECTION INTRAMUSCULAR; INTRAVENOUS ONCE AS NEEDED
Status: DISCONTINUED | OUTPATIENT
Start: 2019-07-26 | End: 2019-07-26 | Stop reason: HOSPADM

## 2019-07-26 RX ORDER — MAGNESIUM CARB/ALUMINUM HYDROX 105-160MG
30 TABLET,CHEWABLE ORAL ONCE
Status: DISCONTINUED | OUTPATIENT
Start: 2019-07-26 | End: 2019-07-26 | Stop reason: HOSPADM

## 2019-07-26 RX ORDER — LANOLIN 100 %
OINTMENT (GRAM) TOPICAL AS NEEDED
Status: DISCONTINUED | OUTPATIENT
Start: 2019-07-26 | End: 2019-07-28 | Stop reason: HOSPADM

## 2019-07-26 RX ORDER — HYDROCODONE BITARTRATE AND ACETAMINOPHEN 5; 325 MG/1; MG/1
1 TABLET ORAL EVERY 4 HOURS PRN
Status: DISCONTINUED | OUTPATIENT
Start: 2019-07-26 | End: 2019-07-26 | Stop reason: HOSPADM

## 2019-07-26 RX ORDER — METHYLERGONOVINE MALEATE 0.2 MG/ML
200 INJECTION INTRAVENOUS ONCE AS NEEDED
Status: DISCONTINUED | OUTPATIENT
Start: 2019-07-26 | End: 2019-07-26 | Stop reason: HOSPADM

## 2019-07-26 RX ORDER — TRISODIUM CITRATE DIHYDRATE AND CITRIC ACID MONOHYDRATE 500; 334 MG/5ML; MG/5ML
30 SOLUTION ORAL ONCE
Status: DISCONTINUED | OUTPATIENT
Start: 2019-07-26 | End: 2019-07-26 | Stop reason: HOSPADM

## 2019-07-26 RX ORDER — EPHEDRINE SULFATE/0.9% NACL/PF 25 MG/5 ML
10 SYRINGE (ML) INTRAVENOUS
Status: DISCONTINUED | OUTPATIENT
Start: 2019-07-26 | End: 2019-07-26 | Stop reason: HOSPADM

## 2019-07-26 RX ORDER — METOCLOPRAMIDE HYDROCHLORIDE 5 MG/ML
10 INJECTION INTRAMUSCULAR; INTRAVENOUS ONCE AS NEEDED
Status: DISCONTINUED | OUTPATIENT
Start: 2019-07-26 | End: 2019-07-26 | Stop reason: HOSPADM

## 2019-07-26 RX ORDER — OXYCODONE HYDROCHLORIDE AND ACETAMINOPHEN 5; 325 MG/1; MG/1
1 TABLET ORAL EVERY 4 HOURS PRN
Status: DISCONTINUED | OUTPATIENT
Start: 2019-07-26 | End: 2019-07-28 | Stop reason: HOSPADM

## 2019-07-26 RX ORDER — ONDANSETRON 4 MG/1
4 TABLET, FILM COATED ORAL EVERY 6 HOURS PRN
Status: DISCONTINUED | OUTPATIENT
Start: 2019-07-26 | End: 2019-07-26 | Stop reason: HOSPADM

## 2019-07-26 RX ORDER — NIFEDIPINE 10 MG/1
10 CAPSULE ORAL EVERY 4 HOURS PRN
Status: DISCONTINUED | OUTPATIENT
Start: 2019-07-26 | End: 2019-07-28 | Stop reason: HOSPADM

## 2019-07-26 RX ORDER — LIDOCAINE HYDROCHLORIDE AND EPINEPHRINE 15; 5 MG/ML; UG/ML
INJECTION, SOLUTION EPIDURAL AS NEEDED
Status: DISCONTINUED | OUTPATIENT
Start: 2019-07-26 | End: 2019-07-26 | Stop reason: SURG

## 2019-07-26 RX ORDER — SODIUM CHLORIDE, SODIUM LACTATE, POTASSIUM CHLORIDE, CALCIUM CHLORIDE 600; 310; 30; 20 MG/100ML; MG/100ML; MG/100ML; MG/100ML
125 INJECTION, SOLUTION INTRAVENOUS CONTINUOUS
Status: DISCONTINUED | OUTPATIENT
Start: 2019-07-26 | End: 2019-07-28 | Stop reason: HOSPADM

## 2019-07-26 RX ORDER — ONDANSETRON 2 MG/ML
4 INJECTION INTRAMUSCULAR; INTRAVENOUS EVERY 6 HOURS PRN
Status: DISCONTINUED | OUTPATIENT
Start: 2019-07-26 | End: 2019-07-26 | Stop reason: HOSPADM

## 2019-07-26 RX ORDER — PROMETHAZINE HYDROCHLORIDE 25 MG/1
25 TABLET ORAL EVERY 6 HOURS PRN
Status: DISCONTINUED | OUTPATIENT
Start: 2019-07-26 | End: 2019-07-28 | Stop reason: HOSPADM

## 2019-07-26 RX ADMIN — LIDOCAINE HYDROCHLORIDE AND EPINEPHRINE 2 ML: 15; 5 INJECTION, SOLUTION EPIDURAL at 09:04

## 2019-07-26 RX ADMIN — OXYTOCIN 85 ML/HR: 10 INJECTION INTRAVENOUS at 15:33

## 2019-07-26 RX ADMIN — OXYTOCIN 85 ML/HR: 10 INJECTION INTRAVENOUS at 15:16

## 2019-07-26 RX ADMIN — CLINDAMYCIN PHOSPHATE 900 MG: 900 INJECTION, SOLUTION INTRAVENOUS at 09:10

## 2019-07-26 RX ADMIN — ROPIVACAINE HYDROCHLORIDE 10 ML: 5 INJECTION, SOLUTION EPIDURAL; INFILTRATION; PERINEURAL at 09:08

## 2019-07-26 RX ADMIN — NIFEDIPINE 10 MG: 10 CAPSULE ORAL at 20:48

## 2019-07-26 RX ADMIN — OXYTOCIN 650 ML/HR: 10 INJECTION INTRAVENOUS at 14:42

## 2019-07-26 RX ADMIN — SODIUM CHLORIDE, POTASSIUM CHLORIDE, SODIUM LACTATE AND CALCIUM CHLORIDE 1000 ML: 600; 310; 30; 20 INJECTION, SOLUTION INTRAVENOUS at 08:23

## 2019-07-26 RX ADMIN — LIDOCAINE HYDROCHLORIDE AND EPINEPHRINE 3 ML: 15; 5 INJECTION, SOLUTION EPIDURAL at 09:01

## 2019-07-26 RX ADMIN — ROPIVACAINE HYDROCHLORIDE 14 ML/HR: 2 INJECTION, SOLUTION EPIDURAL; INFILTRATION at 09:12

## 2019-07-26 RX ADMIN — CLINDAMYCIN PHOSPHATE 900 MG: 900 INJECTION, SOLUTION INTRAVENOUS at 01:20

## 2019-07-26 RX ADMIN — DOCUSATE SODIUM 100 MG: 100 CAPSULE, LIQUID FILLED ORAL at 20:45

## 2019-07-26 RX ADMIN — SODIUM CHLORIDE, POTASSIUM CHLORIDE, SODIUM LACTATE AND CALCIUM CHLORIDE 125 ML/HR: 600; 310; 30; 20 INJECTION, SOLUTION INTRAVENOUS at 09:39

## 2019-07-26 RX ADMIN — SODIUM CHLORIDE, POTASSIUM CHLORIDE, SODIUM LACTATE AND CALCIUM CHLORIDE 1000 ML: 600; 310; 30; 20 INJECTION, SOLUTION INTRAVENOUS at 08:42

## 2019-07-26 RX ADMIN — FENTANYL CITRATE 100 MCG: 50 INJECTION, SOLUTION INTRAMUSCULAR; INTRAVENOUS at 09:04

## 2019-07-26 RX ADMIN — FAMOTIDINE 20 MG: 10 INJECTION, SOLUTION INTRAVENOUS at 11:16

## 2019-07-26 RX ADMIN — ROPIVACAINE HYDROCHLORIDE 8 ML: 5 INJECTION, SOLUTION EPIDURAL; INFILTRATION; PERINEURAL at 12:50

## 2019-07-26 NOTE — ANESTHESIA PROCEDURE NOTES
Labor Epidural      Patient reassessed immediately prior to procedure    Patient location during procedure: floor  Performed By  Anesthesiologist: Dario Reid MD  CRNA: Mandy Gambino CRNA  Preanesthetic Checklist  Completed: patient identified, surgical consent, pre-op evaluation, timeout performed, IV checked, risks and benefits discussed and monitors and equipment checked  Prep:  Pt Position:sitting  Sterile Tech:cap, gloves, mask and sterile barrier  Prep:DuraPrep  Monitoring:blood pressure monitoring  Epidural Block Procedure:  Approach:midline  Guidance:landmark technique and palpation technique  Location:L3-L4  Needle Type:Tuohy  Needle Gauge:17 G  Loss of Resistance Medium: air  Loss of Resistance: 7cm  Cath Depth at skin:12 cm  Paresthesia: none  Aspiration:negative  Test Dose:negative  Number of Attempts: 1  Post Assessment:  Dressing:occlusive dressing applied and secured with tape  Pt Tolerance:patient tolerated the procedure well with no apparent complications  Complications:no

## 2019-07-27 RX ADMIN — DOCUSATE SODIUM 100 MG: 100 CAPSULE, LIQUID FILLED ORAL at 08:23

## 2019-07-27 RX ADMIN — IBUPROFEN 600 MG: 600 TABLET ORAL at 15:45

## 2019-07-27 RX ADMIN — IBUPROFEN 600 MG: 600 TABLET ORAL at 08:23

## 2019-07-27 RX ADMIN — DOCUSATE SODIUM 100 MG: 100 CAPSULE, LIQUID FILLED ORAL at 21:54

## 2019-07-27 RX ADMIN — IBUPROFEN 600 MG: 600 TABLET ORAL at 21:54

## 2019-07-27 NOTE — ANESTHESIA POSTPROCEDURE EVALUATION
Patient: Margie Marrufo    Procedure Summary     Date:  07/26/19 Room / Location:      Anesthesia Start:  0849 Anesthesia Stop:  1442    Procedure:  LABOR ANALGESIA Diagnosis:      Scheduled Providers:   Provider:  Dario Reid MD    Anesthesia Type:  epidural ASA Status:  3          Anesthesia Type: epidural  Last vitals  BP   132/87 (07/27/19 0730)   Temp   98.4 °F (36.9 °C) (07/27/19 0730)   Pulse   119 (07/27/19 0730)   Resp   18 (07/27/19 0730)     SpO2   98 % (07/26/19 1603)     Post Anesthesia Care and Evaluation    Patient location during evaluation: bedside  Patient participation: complete - patient participated  Level of consciousness: awake  Pain score: 0  Pain management: satisfactory to patient  Airway patency: patent  Anesthetic complications: No anesthetic complications  PONV Status: none  Cardiovascular status: acceptable and hemodynamically stable  Respiratory status: acceptable  Hydration status: acceptable  Post Neuraxial Block status: Motor and sensory function returned to baseline and No signs or symptoms of PDPH

## 2019-07-28 VITALS
OXYGEN SATURATION: 98 % | HEART RATE: 101 BPM | SYSTOLIC BLOOD PRESSURE: 130 MMHG | WEIGHT: 222 LBS | DIASTOLIC BLOOD PRESSURE: 89 MMHG | HEIGHT: 64 IN | RESPIRATION RATE: 16 BRPM | BODY MASS INDEX: 37.9 KG/M2 | TEMPERATURE: 97.8 F

## 2019-07-28 RX ADMIN — IBUPROFEN 600 MG: 600 TABLET ORAL at 08:07

## 2019-08-01 LAB — POC AMNISURE: POSITIVE

## 2019-08-01 PROCEDURE — 84112 EVAL AMNIOTIC FLUID PROTEIN: CPT | Performed by: OBSTETRICS & GYNECOLOGY

## 2019-08-05 LAB
CYTO UR: NORMAL
LAB AP CASE REPORT: NORMAL
LAB AP CLINICAL INFORMATION: NORMAL
PATH REPORT.FINAL DX SPEC: NORMAL
PATH REPORT.GROSS SPEC: NORMAL

## 2019-08-08 ENCOUNTER — HOSPITAL ENCOUNTER (OUTPATIENT)
Dept: WOMENS IMAGING | Facility: HOSPITAL | Age: 29
End: 2019-08-08

## 2020-01-01 NOTE — ANESTHESIA PREPROCEDURE EVALUATION
Anesthesia Evaluation     Patient summary reviewed and Nursing notes reviewed   NPO Solid Status: > 6 hours  NPO Liquid Status: < 2 hours           Airway   Mallampati: II  TM distance: >3 FB  Neck ROM: full  Dental      Pulmonary - negative pulmonary ROS   Cardiovascular - negative cardio ROS        Neuro/Psych- negative ROS  GI/Hepatic/Renal/Endo    (+) morbid obesity,  diabetes mellitus gestational,     Musculoskeletal (-) negative ROS    Abdominal    Substance History - negative use     OB/GYN    (+) Pregnant,         Other - negative ROS                       Anesthesia Plan    ASA 3     epidural     Anesthetic plan, all risks, benefits, and alternatives have been provided, discussed and informed consent has been obtained with: patient.       Statement Selected

## 2020-02-01 ENCOUNTER — HOSPITAL ENCOUNTER (EMERGENCY)
Facility: HOSPITAL | Age: 30
Discharge: HOME | End: 2020-02-02
Attending: EMERGENCY MEDICINE | Admitting: EMERGENCY MEDICINE

## 2020-02-01 ENCOUNTER — APPOINTMENT (OUTPATIENT)
Dept: CT IMAGING | Facility: HOSPITAL | Age: 30
End: 2020-02-01

## 2020-02-01 VITALS
DIASTOLIC BLOOD PRESSURE: 90 MMHG | OXYGEN SATURATION: 98 % | HEART RATE: 100 BPM | BODY MASS INDEX: 39.27 KG/M2 | RESPIRATION RATE: 16 BRPM | SYSTOLIC BLOOD PRESSURE: 129 MMHG | WEIGHT: 230 LBS | HEIGHT: 64 IN | TEMPERATURE: 97.9 F

## 2020-02-01 DIAGNOSIS — E04.1 LEFT THYROID NODULE: Primary | ICD-10-CM

## 2020-02-01 LAB
B-HCG UR QL: NEGATIVE
BASOPHILS # BLD AUTO: 0.01 10*3/MM3 (ref 0–0.2)
BASOPHILS NFR BLD AUTO: 0.1 % (ref 0–1.5)
CREAT BLDA-MCNC: 0.8 MG/DL
CREAT BLDA-MCNC: 0.8 MG/DL (ref 0.6–1.3)
DEPRECATED RDW RBC AUTO: 40.8 FL (ref 37–54)
EOSINOPHIL # BLD AUTO: 0.14 10*3/MM3 (ref 0–0.4)
EOSINOPHIL NFR BLD AUTO: 1.5 % (ref 0.3–6.2)
ERYTHROCYTE [DISTWIDTH] IN BLOOD BY AUTOMATED COUNT: 13 % (ref 12.3–15.4)
HCT VFR BLD AUTO: 44.1 % (ref 34–46.6)
HETEROPH AB SER QL LA: NEGATIVE
HGB BLD-MCNC: 14.3 G/DL (ref 12–15.9)
IMM GRANULOCYTES # BLD AUTO: 0.04 10*3/MM3 (ref 0–0.05)
IMM GRANULOCYTES NFR BLD AUTO: 0.4 % (ref 0–0.5)
INTERNAL NEGATIVE CONTROL: NEGATIVE
INTERNAL POSITIVE CONTROL: POSITIVE
LYMPHOCYTES # BLD AUTO: 2.63 10*3/MM3 (ref 0.7–3.1)
LYMPHOCYTES NFR BLD AUTO: 28.9 % (ref 19.6–45.3)
Lab: NORMAL
MCH RBC QN AUTO: 28.4 PG (ref 26.6–33)
MCHC RBC AUTO-ENTMCNC: 32.4 G/DL (ref 31.5–35.7)
MCV RBC AUTO: 87.5 FL (ref 79–97)
MONOCYTES # BLD AUTO: 0.61 10*3/MM3 (ref 0.1–0.9)
MONOCYTES NFR BLD AUTO: 6.7 % (ref 5–12)
NEUTROPHILS # BLD AUTO: 5.66 10*3/MM3 (ref 1.7–7)
NEUTROPHILS NFR BLD AUTO: 62.4 % (ref 42.7–76)
NRBC BLD AUTO-RTO: 0 /100 WBC (ref 0–0.2)
PLATELET # BLD AUTO: 317 10*3/MM3 (ref 140–450)
PMV BLD AUTO: 10.3 FL (ref 6–12)
RBC # BLD AUTO: 5.04 10*6/MM3 (ref 3.77–5.28)
S PYO AG THROAT QL: NEGATIVE
WBC NRBC COR # BLD: 9.09 10*3/MM3 (ref 3.4–10.8)

## 2020-02-01 PROCEDURE — 70491 CT SOFT TISSUE NECK W/DYE: CPT

## 2020-02-01 PROCEDURE — 99283 EMERGENCY DEPT VISIT LOW MDM: CPT

## 2020-02-01 PROCEDURE — 86308 HETEROPHILE ANTIBODY SCREEN: CPT | Performed by: EMERGENCY MEDICINE

## 2020-02-01 PROCEDURE — 84439 ASSAY OF FREE THYROXINE: CPT | Performed by: EMERGENCY MEDICINE

## 2020-02-01 PROCEDURE — 87880 STREP A ASSAY W/OPTIC: CPT | Performed by: EMERGENCY MEDICINE

## 2020-02-01 PROCEDURE — 81025 URINE PREGNANCY TEST: CPT | Performed by: EMERGENCY MEDICINE

## 2020-02-01 PROCEDURE — 87081 CULTURE SCREEN ONLY: CPT | Performed by: EMERGENCY MEDICINE

## 2020-02-01 PROCEDURE — 25010000002 IOPAMIDOL 61 % SOLUTION: Performed by: EMERGENCY MEDICINE

## 2020-02-01 PROCEDURE — 82565 ASSAY OF CREATININE: CPT

## 2020-02-01 PROCEDURE — 84443 ASSAY THYROID STIM HORMONE: CPT | Performed by: EMERGENCY MEDICINE

## 2020-02-01 PROCEDURE — 85025 COMPLETE CBC W/AUTO DIFF WBC: CPT | Performed by: EMERGENCY MEDICINE

## 2020-02-01 RX ORDER — SODIUM CHLORIDE 0.9 % (FLUSH) 0.9 %
10 SYRINGE (ML) INJECTION AS NEEDED
Status: DISCONTINUED | OUTPATIENT
Start: 2020-02-01 | End: 2020-02-02 | Stop reason: HOSPADM

## 2020-02-01 RX ADMIN — IOPAMIDOL 75 ML: 612 INJECTION, SOLUTION INTRAVENOUS at 23:41

## 2020-02-02 LAB
T4 FREE SERPL-MCNC: 1.24 NG/DL (ref 0.93–1.7)
TSH SERPL DL<=0.05 MIU/L-ACNC: 2.49 UIU/ML (ref 0.27–4.2)

## 2020-02-02 NOTE — ED PROVIDER NOTES
"Subjective   29-year-old female presents for evaluation of \"lump on neck.\"  Of note, the patient states that she has been experiencing \"cold symptoms\" and sore throat for the past couple of days.  Today, she noted what appears to be a \"lump\" to her mid left neck and was concerned.  She denies any fevers.  She states that swallowing is a bit difficult.  No known sick contacts.  No neck stiffness.      History provided by:  Patient   used: No    Sore Throat   Location:  Left  Quality:  Sore  Severity:  Moderate  Onset quality:  Sudden  Duration:  1 day  Timing:  Constant  Progression:  Worsening  Chronicity:  New  Relieved by:  None tried  Worsened by:  Nothing  Ineffective treatments:  None tried  Associated symptoms: adenopathy and trouble swallowing    Associated symptoms: no fever        Review of Systems   Constitutional: Negative for fever.   HENT: Positive for sore throat and trouble swallowing.    Hematological: Positive for adenopathy.   All other systems reviewed and are negative.      Past Medical History:   Diagnosis Date   • Broken ribs    • Gestational diabetes    • PCOS (polycystic ovarian syndrome)    • Urinary tract infection     havent had a uti in a long time   • Urogenital trichomoniasis     treated many years ago       Allergies   Allergen Reactions   • Penicillins Palpitations       Past Surgical History:   Procedure Laterality Date   • CERVICAL CERCLAGE N/A 5/3/2019    Procedure: CERVICAL CERCLAGE;  Surgeon: Milligan, Douglas A, MD;  Location: [facility] LABOR DELIVERY;  Service: Obstetrics/Gynecology   • CHOLECYSTECTOMY     • EAR TUBES     • TONSILLECTOMY AND ADENOIDECTOMY         Family History   Problem Relation Age of Onset   • Coronary artery disease Father    • Hypertension Father    • Coronary artery disease Mother    • Diabetes Mother    • Hypertension Mother        Social History     Socioeconomic History   • Marital status: Single     Spouse name: Not on file   • Number " "of children: Not on file   • Years of education: Not on file   • Highest education level: Not on file   Tobacco Use   • Smoking status: Former Smoker     Types: Cigarettes     Last attempt to quit: 3/25/2017     Years since quittin.8   • Smokeless tobacco: Never Used   Substance and Sexual Activity   • Alcohol use: No   • Drug use: No   • Sexual activity: Defer     Partners: Male         Objective   Physical Exam   Constitutional: She is oriented to person, place, and time. She appears well-developed and well-nourished. No distress.   Nontoxic-appearing female   HENT:   Head: Normocephalic and atraumatic.   Mouth/Throat: Uvula is midline and oropharynx is clear and moist. Mucous membranes are not dry. No tonsillar exudate.   Posterior oropharynx unremarkable in appearance, uvula midline, no trismus, no tonsillar exudates present, no asymmetry    No nuchal rigidity    Palpable mass noted to mid left neck, the lesion is nonfluctuant and not matted down, no overlying warmth erythema noted   Eyes: Pupils are equal, round, and reactive to light. EOM are normal.   Neck: Normal range of motion. Neck supple.   Cardiovascular: Normal rate, regular rhythm and normal heart sounds. Exam reveals no gallop and no friction rub.   No murmur heard.  Pulmonary/Chest: Effort normal and breath sounds normal. No respiratory distress. She has no wheezes. She has no rales.   Musculoskeletal: Normal range of motion.   Neurological: She is alert and oriented to person, place, and time.   Skin: Skin is warm and dry. No rash noted. She is not diaphoretic. No erythema.   Psychiatric: She has a normal mood and affect. Judgment and thought content normal.   Nursing note and vitals reviewed.      Procedures         ED Course  ED Course as of 33   Sat 2 29-year-old female presents for evaluation of \"lump on neck.\"  On arrival to the ED, patient nontoxic-appearing.  Exam remarkable for palpable mass to mid left neck.  " "Oropharynx is clear and unremarkable in appearance.  The patient is tolerating p.o.  I performed a bedside ultrasound of the patient's lesion, and there appears to be a well-circumscribed anechoic lesion that is most likely cystic in nature but did have internal appearing septations concerning for potential other etiologies.  It did not have the classic appearance of a lymph node.  Given this finding, we will obtain labs and advanced imaging to rule out emergent process and will reassess following initial interventions.    [DD]   2346 Strep negative.  Labs unrevealing.    [DD]   Sun Feb 02, 2020   0003 Mono negative.    [DD]   0005 CT consistent with large left thyroid nodule but otherwise unrevealing.  Patient reassured.  She was referred to Dr. Martin of otolaryngology and will follow-up within the next week.  Agreeable with plan and given appropriate strict return precautions.    [DD]      ED Course User Index  [DD] Major Martinez MD     No results found for this or any previous visit (from the past 24 hour(s)).  Note: In addition to lab results from this visit, the labs listed above may include labs taken at another facility or during a different encounter within the last 24 hours. Please correlate lab times with ED admission and discharge times for further clarification of the services performed during this visit.    CT Soft Tissue Neck With Contrast    (Results Pending)     Vitals:    02/01/20 2200   BP: 129/90   BP Location: Left arm   Patient Position: Sitting   Pulse: 100   Resp: 16   Temp: 97.9 °F (36.6 °C)   TempSrc: Oral   SpO2: 98%   Weight: 104 kg (230 lb)   Height: 162.6 cm (64\")     Medications   sodium chloride 0.9 % flush 10 mL (has no administration in time range)     ECG/EMG Results (last 24 hours)     ** No results found for the last 24 hours. **        No orders to display                       Meredith Coma Scale Score: 15                        Recent Results (from the past 24 hour(s)) "   Rapid Strep A Screen - Swab, Throat    Collection Time: 02/01/20 10:36 PM   Result Value Ref Range    Strep A Ag Negative Negative   TSH    Collection Time: 02/01/20 10:58 PM   Result Value Ref Range    TSH 2.490 0.270 - 4.200 uIU/mL   T4, Free    Collection Time: 02/01/20 10:58 PM   Result Value Ref Range    Free T4 1.24 0.93 - 1.70 ng/dL   CBC Auto Differential    Collection Time: 02/01/20 10:58 PM   Result Value Ref Range    WBC 9.09 3.40 - 10.80 10*3/mm3    RBC 5.04 3.77 - 5.28 10*6/mm3    Hemoglobin 14.3 12.0 - 15.9 g/dL    Hematocrit 44.1 34.0 - 46.6 %    MCV 87.5 79.0 - 97.0 fL    MCH 28.4 26.6 - 33.0 pg    MCHC 32.4 31.5 - 35.7 g/dL    RDW 13.0 12.3 - 15.4 %    RDW-SD 40.8 37.0 - 54.0 fl    MPV 10.3 6.0 - 12.0 fL    Platelets 317 140 - 450 10*3/mm3    Neutrophil % 62.4 42.7 - 76.0 %    Lymphocyte % 28.9 19.6 - 45.3 %    Monocyte % 6.7 5.0 - 12.0 %    Eosinophil % 1.5 0.3 - 6.2 %    Basophil % 0.1 0.0 - 1.5 %    Immature Grans % 0.4 0.0 - 0.5 %    Neutrophils, Absolute 5.66 1.70 - 7.00 10*3/mm3    Lymphocytes, Absolute 2.63 0.70 - 3.10 10*3/mm3    Monocytes, Absolute 0.61 0.10 - 0.90 10*3/mm3    Eosinophils, Absolute 0.14 0.00 - 0.40 10*3/mm3    Basophils, Absolute 0.01 0.00 - 0.20 10*3/mm3    Immature Grans, Absolute 0.04 0.00 - 0.05 10*3/mm3    nRBC 0.0 0.0 - 0.2 /100 WBC   Mononucleosis Screen    Collection Time: 02/01/20 10:58 PM   Result Value Ref Range    Monospot Negative Negative   POC Creatinine    Collection Time: 02/01/20 11:11 PM   Result Value Ref Range    Creatinine 0.80 0.60 - 1.30 mg/dL   POCT, Creatinine    Collection Time: 02/01/20 11:19 PM   Result Value Ref Range    Creatinine 0.80 mg/dL   POCT Pregnancy, Urine    Collection Time: 02/01/20 11:19 PM   Result Value Ref Range    HCG, Urine, QL Negative Negative    Lot Number [id number]     Internal Positive Control Positive     Internal Negative Control Negative      Note: In addition to lab results from this visit, the labs listed above may  "include labs taken at another facility or during a different encounter within the last 24 hours. Please correlate lab times with ED admission and discharge times for further clarification of the services performed during this visit.    CT Soft Tissue Neck With Contrast   Final Result   1.  No acute abnormality within the neck.   2.  No active inflammatory process or abscess is identified.   3.  Large left thyroid nodule. Follow-up thyroid ultrasound imaging on an outpatient basis for further characterization.      Signer Name: Akshat Baker MD    Signed: 2/2/2020 12:00 AM    Workstation Name: [facility]     [facility]        Vitals:    02/01/20 2200   BP: 129/90   BP Location: Left arm   Patient Position: Sitting   Pulse: 100   Resp: 16   Temp: 97.9 °F (36.6 °C)   TempSrc: Oral   SpO2: 98%   Weight: 104 kg (230 lb)   Height: 162.6 cm (64\")     Medications   iopamidol (ISOVUE-300) 61 % injection 100 mL (75 mL Intravenous Given 2/1/20 0817)     ECG/EMG Results (last 24 hours)     ** No results found for the last 24 hours. **        No orders to display           MDM    Final diagnoses:   Left thyroid nodule       Documentation assistance provided by payton Maurer.  Information recorded by the scribregla was done at my direction and has been verified and validated by me.     Ct Maurer  02/01/20 5452       Major Martinez MD  02/02/20 2636    "

## 2020-02-03 LAB — BACTERIA SPEC AEROBE CULT: NORMAL

## 2020-07-16 NOTE — PLAN OF CARE
Problem: Patient Care Overview  Goal: Plan of Care Review  Outcome: Ongoing (interventions implemented as appropriate)   03/26/18 0761   Coping/Psychosocial   Plan of Care Reviewed With patient   Plan of Care Review   Progress no change         
Problem: Patient Care Overview  Goal: Plan of Care Review  Outcome: Ongoing (interventions implemented as appropriate)   03/26/18 1328   Coping/Psychosocial   Plan of Care Reviewed With patient   Plan of Care Review   Progress no change         
Statement Selected

## 2020-11-19 ENCOUNTER — TELEPHONE (OUTPATIENT)
Dept: OBSTETRICS AND GYNECOLOGY | Facility: CLINIC | Age: 30
End: 2020-11-19

## 2020-11-19 NOTE — TELEPHONE ENCOUNTER
PT STATED THAT SHE IS HAVING BAD BACK PAIN AND PAIN IN HER SIDES WHERE HER OVARIES ARE LOCATED. PT IS ALSO HAVING HEAVY DISCHARGE, AND HEAVY BLEEDING ON MENSTRUAL CYCLE. HER MENSTRUAL CYCLE ALSO SEEMS TO BE CHANGING TIMES

## 2020-11-20 ENCOUNTER — TELEPHONE (OUTPATIENT)
Dept: OBSTETRICS AND GYNECOLOGY | Facility: CLINIC | Age: 30
End: 2020-11-20

## 2020-11-20 NOTE — TELEPHONE ENCOUNTER
PT CALLED YESTERDAY:      PT STATED THAT SHE IS HAVING BAD BACK PAIN AND PAIN IN HER SIDES WHERE HER OVARIES ARE LOCATED. PT IS ALSO HAVING HEAVY DISCHARGE, AND HEAVY BLEEDING ON MENSTRUAL CYCLE. HER MENSTRUAL CYCLE ALSO SEEMS TO BE CHANGING TIMES     PHONE NUMBER WAS INCORRECT SO PT IS NOW CALLING BACK & GLENDY CHANGED HER CONTACT NUMBER.

## 2021-01-05 ENCOUNTER — TELEPHONE (OUTPATIENT)
Dept: OBSTETRICS AND GYNECOLOGY | Facility: CLINIC | Age: 31
End: 2021-01-05

## 2021-01-05 NOTE — TELEPHONE ENCOUNTER
Called pt to ask permission to use Barragan that pt donated to the office after pregnancy.  Pt gave permission to use medication she donated to another pt whose Barragan has not arrived in the office yet.

## 2023-06-01 ENCOUNTER — OFFICE VISIT (OUTPATIENT)
Dept: OBSTETRICS AND GYNECOLOGY | Facility: CLINIC | Age: 33
End: 2023-06-01

## 2023-06-01 VITALS
SYSTOLIC BLOOD PRESSURE: 148 MMHG | BODY MASS INDEX: 41.48 KG/M2 | HEIGHT: 64 IN | WEIGHT: 243 LBS | DIASTOLIC BLOOD PRESSURE: 100 MMHG

## 2023-06-01 DIAGNOSIS — Z31.9 PATIENT DESIRES PREGNANCY: ICD-10-CM

## 2023-06-01 DIAGNOSIS — N94.6 DYSMENORRHEA: ICD-10-CM

## 2023-06-01 DIAGNOSIS — N93.9 ABNORMAL UTERINE BLEEDING (AUB): Primary | ICD-10-CM

## 2023-06-01 RX ORDER — OMEPRAZOLE 20 MG/1
20 CAPSULE, DELAYED RELEASE ORAL DAILY
COMMUNITY

## 2023-06-01 RX ORDER — LETROZOLE 2.5 MG/1
TABLET, FILM COATED ORAL
Qty: 5 TABLET | Refills: 0 | Status: SHIPPED | OUTPATIENT
Start: 2023-06-01

## 2023-06-01 RX ORDER — MEDROXYPROGESTERONE ACETATE 10 MG/1
10 TABLET ORAL DAILY
Qty: 14 TABLET | Refills: 2 | Status: SHIPPED | OUTPATIENT
Start: 2023-06-01

## 2023-06-01 RX ORDER — FAMOTIDINE 20 MG/1
20 TABLET, FILM COATED ORAL 2 TIMES DAILY
COMMUNITY

## 2023-06-01 NOTE — PROGRESS NOTES
Chief Complaint   Patient presents with   • Dysmenorrhea   • Menstrual Problem         Subjective   HPI  Margie Marrufo is a 33 y.o. female, , No LMP recorded (lmp unknown).. She presents for initial evaluation of irregular periods, menorrhagia, dysmenorrhea. The menstrual problem began months  ago. Prior to today's visit, the patient has been evaluated:  No. In the past the patient has not tried any treatment for management. The patient reports additional symptoms as severe cramping, back/hip pain/burning, diarrhea prior to periods.    Patient with h/o PCOS. States has been 60+ days with out menses in the past. She is now c/o bleeding x4-5 weeks, states heavy at times-soaking through pad/tampon <1H with clotting.       US was not done today.     Thromboembolic Disease: none  History of hypertension: no but patient recently evaluated for elevated BP in ER-states BP on admission 209/104-pcp feels like this is related to anxiety.  History of migraines: no  Tobacco Usage?: No     Additional OB/GYN History   Last Pap :   Last Completed Pap Smear     This patient has no relevant Health Maintenance data.            Current Outpatient Medications:   •  famotidine (PEPCID) 20 MG tablet, Take 1 tablet by mouth 2 (Two) Times a Day., Disp: , Rfl:   •  omeprazole (priLOSEC) 20 MG capsule, Take 1 capsule by mouth Daily., Disp: , Rfl:   •  letrozole (Femara) 2.5 MG tablet, 1 po day 3 thru 7 of cycle, Disp: 5 tablet, Rfl: 0  •  medroxyPROGESTERone (Provera) 10 MG tablet, Take 1 tablet by mouth Daily., Disp: 14 tablet, Rfl: 2  •  Prenatal Vit-Fe Fumarate-FA (PRENATAL ) 27-1 MG tablet tablet, Take  by mouth Daily. (Patient not taking: Reported on 2023), Disp: , Rfl:      Past Medical History:   Diagnosis Date   • Asthma    • Broken ribs    • Gestational diabetes    • PCOS (polycystic ovarian syndrome)    • Urinary tract infection     havent had a uti in a long time   • Urogenital trichomoniasis     treated  "many years ago        Past Surgical History:   Procedure Laterality Date   • CERVICAL CERCLAGE N/A 5/3/2019    Procedure: CERVICAL CERCLAGE;  Surgeon: Milligan, Douglas A, MD;  Location: [facility] LABOR DELIVERY;  Service: Obstetrics/Gynecology   • CHOLECYSTECTOMY     • EAR TUBES     • TONSILLECTOMY AND ADENOIDECTOMY           The additional following portions of the patient's history were reviewed and updated as appropriate: allergies, current medications, past family history, past medical history, past social history and past surgical history.    Review of Systems   Constitutional: Negative.    HENT: Negative.    Eyes: Negative.    Respiratory: Negative.    Cardiovascular: Negative.    Gastrointestinal: Negative.    Endocrine: Negative.    Genitourinary: Positive for menstrual problem and pelvic pain.        Dysmenorrhea   Musculoskeletal: Negative.    Skin: Negative.    Allergic/Immunologic: Negative.    Neurological: Negative.    Hematological: Negative.    Psychiatric/Behavioral: Negative.        I have reviewed and agree with the HPI, ROS, and historical information as entered above. Kavitha Prater MD    Objective   /100   Ht 162.6 cm (64\")   Wt 110 kg (243 lb)   LMP  (LMP Unknown) Comment: patient with AUB x4-5 weeks  Breastfeeding No   BMI 41.71 kg/m²     Physical Exam not done     Assessment & Plan     Assessment     Problem List Items Addressed This Visit    None  Visit Diagnoses     Abnormal uterine bleeding (AUB)    -  Primary    Relevant Orders    HCG, B-subunit, Quantitative    Dysmenorrhea        Patient desires pregnancy          pain is poss endometriosis and she wants to rx Femara       Plan     1. Medication(s) Ordered  2. pt desires pregnancy and is having heavy periods and pain   3. Will rx Provera to stop her period and then Lalyara      Kavitha Prater MD  06/01/2023    "

## 2023-06-02 LAB — HCG INTACT+B SERPL-ACNC: <1 MIU/ML

## 2023-06-07 ENCOUNTER — TELEPHONE (OUTPATIENT)
Dept: OBSTETRICS AND GYNECOLOGY | Facility: CLINIC | Age: 33
End: 2023-06-07

## (undated) DEVICE — SCRB SURG BACTOSHIELD CHG 4PCT 4OZ

## (undated) DEVICE — STRAP STIRUP SLP RNG 19X3.5IN DISP

## (undated) DEVICE — LEX VAG DELIVERY PACK: Brand: MEDLINE INDUSTRIES, INC.

## (undated) DEVICE — TOWEL,OR,DSP,ST,BLUE,STD,4/PK,20PK/CS: Brand: MEDLINE

## (undated) DEVICE — SOL IRR SALINE 0.9% 100ML STRL

## (undated) DEVICE — CATHETER,URETHRAL,REDRUBBER,STERILE,22FR: Brand: MEDLINE

## (undated) DEVICE — SUT MERSLN 5MM MO4 12IN WHT RS23

## (undated) DEVICE — COVER,LIGHT HANDLE,FLX,1/PK: Brand: MEDLINE INDUSTRIES, INC.

## (undated) DEVICE — SUT PROLN 0 CT1 30IN 8424H

## (undated) DEVICE — TRY SPINE BLCK WHITACRE 25G 3X5IN

## (undated) DEVICE — GLV SURG EUDERMIC PF LTX 8 STRL

## (undated) DEVICE — GOWN,REINF,POLY,ECL,PP SLV,XL: Brand: MEDLINE

## (undated) DEVICE — MAT PREVALON MOBL TRANSFR AIR WO/PAD 39X80IN

## (undated) DEVICE — TRY SKINPREP DRYPREP